# Patient Record
Sex: MALE | Race: WHITE | NOT HISPANIC OR LATINO | Employment: FULL TIME | URBAN - METROPOLITAN AREA
[De-identification: names, ages, dates, MRNs, and addresses within clinical notes are randomized per-mention and may not be internally consistent; named-entity substitution may affect disease eponyms.]

---

## 2019-07-04 ENCOUNTER — HOSPITAL ENCOUNTER (EMERGENCY)
Facility: CLINIC | Age: 24
Discharge: HOME | End: 2019-07-04
Attending: EMERGENCY MEDICINE | Admitting: EMERGENCY MEDICINE
Payer: COMMERCIAL

## 2019-07-04 VITALS
SYSTOLIC BLOOD PRESSURE: 151 MMHG | WEIGHT: 224 LBS | OXYGEN SATURATION: 96 % | TEMPERATURE: 98.7 F | HEIGHT: 73 IN | DIASTOLIC BLOOD PRESSURE: 78 MMHG | HEART RATE: 89 BPM | BODY MASS INDEX: 29.69 KG/M2 | RESPIRATION RATE: 20 BRPM

## 2019-07-04 DIAGNOSIS — L25.9 CONTACT DERMATITIS, UNSPECIFIED CONTACT DERMATITIS TYPE, UNSPECIFIED TRIGGER: ICD-10-CM

## 2019-07-04 DIAGNOSIS — L03.90 CELLULITIS, UNSPECIFIED CELLULITIS SITE: ICD-10-CM

## 2019-07-04 LAB
ANION GAP SERPL CALCULATED.3IONS-SCNC: 6 MMOL/L (ref 3–14)
BASOPHILS # BLD AUTO: 0 10E9/L (ref 0–0.2)
BASOPHILS NFR BLD AUTO: 0.5 %
BUN SERPL-MCNC: 14 MG/DL (ref 7–30)
CALCIUM SERPL-MCNC: 9 MG/DL (ref 8.5–10.1)
CHLORIDE SERPL-SCNC: 106 MMOL/L (ref 94–109)
CO2 SERPL-SCNC: 29 MMOL/L (ref 20–32)
CREAT SERPL-MCNC: 1.2 MG/DL (ref 0.66–1.25)
DIFFERENTIAL METHOD BLD: NORMAL
EOSINOPHIL # BLD AUTO: 0.2 10E9/L (ref 0–0.7)
EOSINOPHIL NFR BLD AUTO: 2.6 %
ERYTHROCYTE [DISTWIDTH] IN BLOOD BY AUTOMATED COUNT: 12.5 % (ref 10–15)
GFR SERPL CREATININE-BSD FRML MDRD: 84 ML/MIN/{1.73_M2}
GLUCOSE SERPL-MCNC: 107 MG/DL (ref 70–99)
HCT VFR BLD AUTO: 47.9 % (ref 40–53)
HGB BLD-MCNC: 16.3 G/DL (ref 13.3–17.7)
IMM GRANULOCYTES # BLD: 0 10E9/L (ref 0–0.4)
IMM GRANULOCYTES NFR BLD: 0.3 %
LYMPHOCYTES # BLD AUTO: 2.3 10E9/L (ref 0.8–5.3)
LYMPHOCYTES NFR BLD AUTO: 37.6 %
MCH RBC QN AUTO: 29.3 PG (ref 26.5–33)
MCHC RBC AUTO-ENTMCNC: 34 G/DL (ref 31.5–36.5)
MCV RBC AUTO: 86 FL (ref 78–100)
MONOCYTES # BLD AUTO: 0.4 10E9/L (ref 0–1.3)
MONOCYTES NFR BLD AUTO: 6.4 %
NEUTROPHILS # BLD AUTO: 3.3 10E9/L (ref 1.6–8.3)
NEUTROPHILS NFR BLD AUTO: 52.6 %
NRBC # BLD AUTO: 0 10*3/UL
NRBC BLD AUTO-RTO: 0 /100
PLATELET # BLD AUTO: 193 10E9/L (ref 150–450)
POTASSIUM SERPL-SCNC: 3.6 MMOL/L (ref 3.4–5.3)
RBC # BLD AUTO: 5.57 10E12/L (ref 4.4–5.9)
SODIUM SERPL-SCNC: 141 MMOL/L (ref 133–144)
WBC # BLD AUTO: 6.2 10E9/L (ref 4–11)

## 2019-07-04 PROCEDURE — 96374 THER/PROPH/DIAG INJ IV PUSH: CPT

## 2019-07-04 PROCEDURE — 85025 COMPLETE CBC W/AUTO DIFF WBC: CPT | Performed by: EMERGENCY MEDICINE

## 2019-07-04 PROCEDURE — 80048 BASIC METABOLIC PNL TOTAL CA: CPT | Performed by: EMERGENCY MEDICINE

## 2019-07-04 PROCEDURE — 25000132 ZZH RX MED GY IP 250 OP 250 PS 637: Performed by: EMERGENCY MEDICINE

## 2019-07-04 PROCEDURE — 96372 THER/PROPH/DIAG INJ SC/IM: CPT | Mod: 59

## 2019-07-04 PROCEDURE — 96375 TX/PRO/DX INJ NEW DRUG ADDON: CPT

## 2019-07-04 PROCEDURE — 25000128 H RX IP 250 OP 636: Performed by: EMERGENCY MEDICINE

## 2019-07-04 PROCEDURE — 99285 EMERGENCY DEPT VISIT HI MDM: CPT | Mod: 25

## 2019-07-04 RX ORDER — EPINEPHRINE 1 MG/ML
0.5 INJECTION, SOLUTION, CONCENTRATE INTRAVENOUS ONCE
Status: COMPLETED | OUTPATIENT
Start: 2019-07-04 | End: 2019-07-04

## 2019-07-04 RX ORDER — CEPHALEXIN 500 MG/1
500 CAPSULE ORAL 3 TIMES DAILY
Qty: 21 CAPSULE | Refills: 0 | Status: SHIPPED | OUTPATIENT
Start: 2019-07-04 | End: 2019-07-04

## 2019-07-04 RX ORDER — CETIRIZINE HYDROCHLORIDE 10 MG/1
10 TABLET ORAL DAILY
Qty: 10 TABLET | Refills: 0 | Status: SHIPPED | OUTPATIENT
Start: 2019-07-04 | End: 2019-09-20

## 2019-07-04 RX ORDER — DIPHENHYDRAMINE HCL 25 MG
25 CAPSULE ORAL ONCE
Status: COMPLETED | OUTPATIENT
Start: 2019-07-04 | End: 2019-07-04

## 2019-07-04 RX ORDER — CEPHALEXIN 500 MG/1
500 CAPSULE ORAL ONCE
Status: DISCONTINUED | OUTPATIENT
Start: 2019-07-04 | End: 2019-07-04 | Stop reason: HOSPADM

## 2019-07-04 RX ORDER — DIPHENHYDRAMINE HYDROCHLORIDE 50 MG/ML
25 INJECTION INTRAMUSCULAR; INTRAVENOUS ONCE
Status: COMPLETED | OUTPATIENT
Start: 2019-07-04 | End: 2019-07-04

## 2019-07-04 RX ORDER — PREDNISONE 20 MG/1
40 TABLET ORAL DAILY
Qty: 8 TABLET | Refills: 0 | Status: SHIPPED | OUTPATIENT
Start: 2019-07-04 | End: 2019-09-20

## 2019-07-04 RX ORDER — HYDROXYZINE HYDROCHLORIDE 25 MG/1
25 TABLET, FILM COATED ORAL
Qty: 10 TABLET | Refills: 0 | Status: SHIPPED | OUTPATIENT
Start: 2019-07-04 | End: 2022-07-13

## 2019-07-04 RX ORDER — METHYLPREDNISOLONE SODIUM SUCCINATE 125 MG/2ML
125 INJECTION, POWDER, LYOPHILIZED, FOR SOLUTION INTRAMUSCULAR; INTRAVENOUS ONCE
Status: COMPLETED | OUTPATIENT
Start: 2019-07-04 | End: 2019-07-04

## 2019-07-04 RX ORDER — DOXYCYCLINE 100 MG/1
100 CAPSULE ORAL 2 TIMES DAILY
Qty: 14 CAPSULE | Refills: 0 | Status: SHIPPED | OUTPATIENT
Start: 2019-07-04 | End: 2019-09-20

## 2019-07-04 RX ORDER — MUPIROCIN 20 MG/G
OINTMENT TOPICAL 3 TIMES DAILY
Qty: 15 G | Refills: 0 | Status: SHIPPED | OUTPATIENT
Start: 2019-07-04 | End: 2022-07-13

## 2019-07-04 RX ADMIN — DIPHENHYDRAMINE HYDROCHLORIDE 25 MG: 50 INJECTION, SOLUTION INTRAMUSCULAR; INTRAVENOUS at 05:26

## 2019-07-04 RX ADMIN — DIPHENHYDRAMINE HYDROCHLORIDE 25 MG: 25 CAPSULE ORAL at 05:26

## 2019-07-04 RX ADMIN — EPINEPHRINE 0.5 MG: 1 INJECTION INTRAMUSCULAR; INTRAVENOUS; SUBCUTANEOUS at 05:26

## 2019-07-04 RX ADMIN — METHYLPREDNISOLONE SODIUM SUCCINATE 125 MG: 125 INJECTION, POWDER, FOR SOLUTION INTRAMUSCULAR; INTRAVENOUS at 05:26

## 2019-07-04 ASSESSMENT — ENCOUNTER SYMPTOMS
VOMITING: 0
CHILLS: 0
COUGH: 0
NAUSEA: 0
FEVER: 0
WOUND: 1

## 2019-07-04 ASSESSMENT — MIFFLIN-ST. JEOR: SCORE: 2059.94

## 2019-07-04 NOTE — DISCHARGE INSTRUCTIONS
Use Zyrtec during the day (less sedating anti-histamine)  and Hydroxyzine at night (more sedating) to help with itching.     Steroids for 4 more days    Change antibiotic to Doxycycline    Use antibiotic ointment (mupirocin) on open areas (L forearm and R ear)

## 2019-07-04 NOTE — ED PROVIDER NOTES
"  History     Chief Complaint:  Allergic Reaction     HPI   John Lamar is a 24 year old male who presents with his mother to the ED for evaluation of allergic reaction. The patient reports he developed bites on his left arm 2 days ago. He has subsequently developed spreading, painful, itchy, blotchy hives all over his body including face, ear, arms, torso, and genital. He last took 25mg Benadryl at 11:00PM without alleviation of symptoms. [place]. He had a tick on his side but was able to pick it off. He also uprooted a flower to bring back to plant. He had no immediate irritation. He did use Raid after planting the flower back home. The patient denies any fever, chills, cough, nausea, vomiting, or penile bleeding/discharge. He denies history of skin infections.       Allergies:  Sulfa drugs: rash      Medications:    Concerta     Past Medical History:    ADHD  Arthritis    Past Surgical History:    History reviewed. No pertinent surgical history.    Family History:    History reviewed. No pertinent family history.     Social History:  Smoking status: Never smoker    Alcohol use: Yes   Presents to ED with mother    Marital Status:  Single [1]     Review of Systems   Constitutional: Negative for chills and fever.   Respiratory: Negative for cough.    Gastrointestinal: Negative for nausea and vomiting.   Genitourinary: Negative for discharge.   Skin: Positive for rash and wound.   All other systems reviewed and are negative.    Physical Exam     Patient Vitals for the past 24 hrs:   BP Temp Temp src Pulse Resp SpO2 Height Weight   07/04/19 0600 141/89 -- -- 85 -- 96 % -- --   07/04/19 0540 (!) 145/97 -- -- 70 -- 98 % -- --   07/04/19 0505 (!) 133/92 -- -- 82 -- 98 % -- --   07/04/19 0455 (!) 145/97 98.7  F (37.1  C) Temporal 71 20 -- 1.854 m (6' 1\") 101.6 kg (224 lb)     Physical Exam    HEENT: Posterior pharynx with no significant swelling, no lingual " swelling or sublingual swelling.  No intraoral sores mmm  Neck: supple, no stridor  CV: ppi, regular   Resp: Clear to auscultation in all fields, speaking in full sentences with any resp distress  Abd: abdomen is soft without significant tenderness, masses, organomegaly or guarding  Ext: peripheral edema present:  No  Skin: warm dry well perfused      Volar surface left forearm cluster of 3 vesicular appearing lesions that spontaneously ruptured now with honey crust surrounded by blanching erythema and numerous lesions extending proximally to the left upper arm.  Distally there is a horizontal area of blanching erythema.  Some of the larger lesions have central clearing.     Right infraorbital tissue with mild erythema and soft tissue swelling.  Mild erythema and soft tissue swelling of the left lateral orbital tissue.  Small area horizontally across the midpoint of the forehead.     Small horizontal area at the waistband of his underwear on the right side of blanching erythema.     Moderate erythema and swelling of the distal shaft of the penis on the right side and superior aspect of the scrotum.  No ulceration at the opening of the urethra or blood or discharge.    Neuro: Alert, no gross motor or sensory deficits,  gait stable            Emergency Department Course     Laboratory:  CBC: o/w WNL (WBC 6.2, HGB 16.3, )  BMP: Glucose 107(H), o/w WNL (Creatinine 1.20)     Interventions:  0526: Epinephrine 0.5mg IM  0526: Solu-medrol 125mg IV  0526: Benadryl 25mg IV  0526: Benadryl 25mg PO    Emergency Department Course:  Past medical records, nursing notes, and vitals reviewed.  0505: I performed an exam of the patient and obtained history, as documented above.    IV inserted and blood drawn.    Patient was provided medications as noted above.    0541: Per nurse's report, the patient notes his throat feels tighter after the interventions.     0542: I rechecked the patient. Explained findings to patient and  mother.    0600: I rechecked the patient.     0625: I rechecked the patient.    Findings and plan explained to the Patient and mother. Patient discharged home with instructions regarding supportive care, medications, and reasons to return. The importance of close follow-up was reviewed.     Impression & Plan      Medical Decision Makin 4-year-old male presenting with likely started as a contact dermatitis with self spread to the affected areas and concern for superimposed impetigo/bacterial skin infection started on Keflex by urgent care yesterday, will change this to doxycycline.  Given a trial of IM epi here in addition to steroids and antihistamine to help with the swelling and itching.  We will keep him on steroids and antihistamines have him follow-up with his PCP and try to given dermatology if not improving as expected.  Patient verbalized understanding and agreement with plan.  Also understands when to return to the ER.     Seems most likely started with some kind of contact irritant based on the pictures he shows me from when it started possible it was a bite from a mite seems less likely to be a tick/Lyme disease.  As the skip lesions of the left forearm to the a forehead penis without mucosal membrane involvement argues against Lee-Daniel syndrome or erythema multiforme or other more systemic conditions.      Diagnosis:    ICD-10-CM   1. Contact dermatitis, unspecified contact dermatitis type, unspecified trigger L25.9   2. Cellulitis, unspecified cellulitis site L03.90     Disposition: Patient discharged to home with mother      Discharge Medications:  cetirizine 10 MG tablet  Commonly known as:  zyrTEC  10 mg, Oral, DAILY     doxycycline hyclate 100 MG capsule  Commonly known as:  VIBRAMYCIN  100 mg, Oral, 2 TIMES DAILY     hydrOXYzine 25 MG tablet  Commonly known as:  ATARAX  25 mg, Oral, AT BEDTIME PRN     mupirocin 2 % external ointment  Commonly known as:  BACTROBAN  Topical, 3 TIMES  DAILY     predniSONE 20 MG tablet  Commonly known as:  DELTASONE  40 mg, Oral, DAILY       Jaycee Martinez  7/4/2019   [facility] EMERGENCY DEPARTMENT    Scribe Disclosure:  I, Jaycee Martinez, am serving as a scribe at 5:05 AM on 7/4/2019 to document services personally performed by Cody Betts MD based on my observations and the provider's statements to me.        Cody Betts MD  07/04/19 0635       Cody Betts MD  07/04/19 0651

## 2019-07-04 NOTE — ED TRIAGE NOTES
Pt to ER with c/o allergic reaction. Pt noted spots on his left arm  On Monday now has spread to right eye and penis as well

## 2019-07-04 NOTE — ED AVS SNAPSHOT
[facility] Emergency Department  [address]  [facility] [address]  Phone:  [phone]  Fax:  [phone]                                    John Lamar   MRN: [id number]    Department:  [facility] Emergency Department   Date of Visit:  7/4/2019           After Visit Summary Signature Page    I have received my discharge instructions, and my questions have been answered. I have discussed any challenges I see with this plan with the nurse or doctor.    ..........................................................................................................................................  Patient/Patient Representative Signature      ..........................................................................................................................................  Patient Representative Print Name and Relationship to Patient    ..................................................               ................................................  Date                                   Time    ..........................................................................................................................................  Reviewed by Signature/Title    ...................................................              ..............................................  Date                                               Time          22EPIC Rev 08/18

## 2019-07-08 ENCOUNTER — TELEPHONE (OUTPATIENT)
Dept: DERMATOLOGY | Facility: CLINIC | Age: 24
End: 2019-07-08

## 2019-07-08 NOTE — TELEPHONE ENCOUNTER
[facility] Call Center    Phone Message    May a detailed message be left on voicemail: yes    Reason for Call: Symptoms or Concerns     If patient has red-flag symptoms, warm transfer to triage line    Current symptom or concern: Allergic reaction (rash) is spreading everywhere    Symptoms have been present for: 7-8 days     Has patient previously been seen for this? Yes    By : Dr. Cody Betts (ER Physician at [place])    Date: 7/04/19    Are there any new or worsening symptoms? Yes: see above    Mom was wondering if it's possible for the pt to come in sooner than 7/15.  Please call mom if there is a sooner date.  Thanks      Action Taken: Message routed to:  [facility] ([facility]): Derm clinic

## 2019-07-15 ENCOUNTER — PRE VISIT (OUTPATIENT)
Dept: DERMATOLOGY | Facility: CLINIC | Age: 24
End: 2019-07-15

## 2019-07-15 NOTE — TELEPHONE ENCOUNTER
FUTURE VISIT INFORMATION      FUTURE VISIT INFORMATION:    Date: 7.15.19    Time: 3:30 pm    Location:  Derm  REFERRAL INFORMATION:    Referring provider:  Dr. Cody Betts    Referring providers clinic:  [facility] Emergency Dept    Reason for visit/diagnosis:  New, allergy reaction for 7-8 days now, rash is still spreading and is now spreading to the back per Mom, referred by Dr. Cody Betts from [place].    RECORDS REQUESTED FROM:       Clinic name Comments Records Status Imaging Status   [facility] ED 7.4.19 Dr. Cody Betts In Epic In Epic

## 2019-09-05 ENCOUNTER — APPOINTMENT (OUTPATIENT)
Dept: CT IMAGING | Facility: CLINIC | Age: 24
End: 2019-09-05
Attending: EMERGENCY MEDICINE
Payer: COMMERCIAL

## 2019-09-05 ENCOUNTER — HOSPITAL ENCOUNTER (OUTPATIENT)
Facility: CLINIC | Age: 24
Discharge: HOME | End: 2019-09-06
Attending: EMERGENCY MEDICINE | Admitting: SURGERY
Payer: COMMERCIAL

## 2019-09-05 DIAGNOSIS — K35.30 ACUTE APPENDICITIS WITH LOCALIZED PERITONITIS, WITHOUT PERFORATION OR ABSCESS, UNSPECIFIED WHETHER GANGRENE PRESENT: ICD-10-CM

## 2019-09-05 LAB
ANION GAP SERPL CALCULATED.3IONS-SCNC: 2 MMOL/L (ref 3–14)
BASOPHILS # BLD AUTO: 0 10E9/L (ref 0–0.2)
BASOPHILS NFR BLD AUTO: 0.4 %
BUN SERPL-MCNC: 16 MG/DL (ref 7–30)
CALCIUM SERPL-MCNC: 9.3 MG/DL (ref 8.5–10.1)
CHLORIDE SERPL-SCNC: 108 MMOL/L (ref 94–109)
CO2 SERPL-SCNC: 32 MMOL/L (ref 20–32)
CREAT SERPL-MCNC: 0.96 MG/DL (ref 0.66–1.25)
DIFFERENTIAL METHOD BLD: NORMAL
EOSINOPHIL # BLD AUTO: 0.1 10E9/L (ref 0–0.7)
EOSINOPHIL NFR BLD AUTO: 0.5 %
ERYTHROCYTE [DISTWIDTH] IN BLOOD BY AUTOMATED COUNT: 12.5 % (ref 10–15)
GFR SERPL CREATININE-BSD FRML MDRD: >90 ML/MIN/{1.73_M2}
GLUCOSE SERPL-MCNC: 105 MG/DL (ref 70–99)
HCT VFR BLD AUTO: 49.5 % (ref 40–53)
HGB BLD-MCNC: 16.8 G/DL (ref 13.3–17.7)
IMM GRANULOCYTES # BLD: 0 10E9/L (ref 0–0.4)
IMM GRANULOCYTES NFR BLD: 0.3 %
LYMPHOCYTES # BLD AUTO: 1.5 10E9/L (ref 0.8–5.3)
LYMPHOCYTES NFR BLD AUTO: 14.6 %
MCH RBC QN AUTO: 29.7 PG (ref 26.5–33)
MCHC RBC AUTO-ENTMCNC: 33.9 G/DL (ref 31.5–36.5)
MCV RBC AUTO: 88 FL (ref 78–100)
MONOCYTES # BLD AUTO: 0.7 10E9/L (ref 0–1.3)
MONOCYTES NFR BLD AUTO: 6.5 %
NEUTROPHILS # BLD AUTO: 7.8 10E9/L (ref 1.6–8.3)
NEUTROPHILS NFR BLD AUTO: 77.7 %
NRBC # BLD AUTO: 0 10*3/UL
NRBC BLD AUTO-RTO: 0 /100
PLATELET # BLD AUTO: 210 10E9/L (ref 150–450)
POTASSIUM SERPL-SCNC: 3.9 MMOL/L (ref 3.4–5.3)
RBC # BLD AUTO: 5.65 10E12/L (ref 4.4–5.9)
SODIUM SERPL-SCNC: 142 MMOL/L (ref 133–144)
WBC # BLD AUTO: 10 10E9/L (ref 4–11)

## 2019-09-05 PROCEDURE — 80048 BASIC METABOLIC PNL TOTAL CA: CPT | Performed by: EMERGENCY MEDICINE

## 2019-09-05 PROCEDURE — 99285 EMERGENCY DEPT VISIT HI MDM: CPT | Mod: 25

## 2019-09-05 PROCEDURE — 85025 COMPLETE CBC W/AUTO DIFF WBC: CPT | Performed by: EMERGENCY MEDICINE

## 2019-09-05 PROCEDURE — 74177 CT ABD & PELVIS W/CONTRAST: CPT

## 2019-09-05 PROCEDURE — 25000128 H RX IP 250 OP 636: Performed by: EMERGENCY MEDICINE

## 2019-09-05 PROCEDURE — 25000125 ZZHC RX 250: Performed by: EMERGENCY MEDICINE

## 2019-09-05 RX ORDER — CEFOTETAN DISODIUM 2 G/20ML
2 INJECTION, POWDER, FOR SOLUTION INTRAMUSCULAR; INTRAVENOUS ONCE
Status: COMPLETED | OUTPATIENT
Start: 2019-09-05 | End: 2019-09-06

## 2019-09-05 RX ORDER — IOPAMIDOL 755 MG/ML
500 INJECTION, SOLUTION INTRAVASCULAR ONCE
Status: COMPLETED | OUTPATIENT
Start: 2019-09-05 | End: 2019-09-05

## 2019-09-05 RX ADMIN — SODIUM CHLORIDE 65 ML: 9 INJECTION, SOLUTION INTRAVENOUS at 23:15

## 2019-09-05 RX ADMIN — IOPAMIDOL 100 ML: 755 INJECTION, SOLUTION INTRAVENOUS at 23:15

## 2019-09-05 ASSESSMENT — ENCOUNTER SYMPTOMS
SHORTNESS OF BREATH: 0
ABDOMINAL DISTENTION: 0
CONSTIPATION: 0
DIARRHEA: 0
VOMITING: 0
ACTIVITY CHANGE: 0
FEVER: 0
DYSURIA: 0
ABDOMINAL PAIN: 1
NAUSEA: 1
DIFFICULTY URINATING: 0
DIZZINESS: 0

## 2019-09-06 ENCOUNTER — ANESTHESIA (OUTPATIENT)
Dept: SURGERY | Facility: CLINIC | Age: 24
End: 2019-09-06
Payer: COMMERCIAL

## 2019-09-06 ENCOUNTER — ANESTHESIA EVENT (OUTPATIENT)
Dept: SURGERY | Facility: CLINIC | Age: 24
End: 2019-09-06
Payer: COMMERCIAL

## 2019-09-06 VITALS
OXYGEN SATURATION: 97 % | DIASTOLIC BLOOD PRESSURE: 82 MMHG | BODY MASS INDEX: 29.03 KG/M2 | TEMPERATURE: 97.5 F | WEIGHT: 220 LBS | HEART RATE: 80 BPM | SYSTOLIC BLOOD PRESSURE: 130 MMHG | RESPIRATION RATE: 20 BRPM

## 2019-09-06 PROCEDURE — 25000128 H RX IP 250 OP 636: Performed by: ANESTHESIOLOGY

## 2019-09-06 PROCEDURE — 71000013 ZZH RECOVERY PHASE 1 LEVEL 1 EA ADDTL HR: Performed by: SURGERY

## 2019-09-06 PROCEDURE — 37000008 ZZH ANESTHESIA TECHNICAL FEE, 1ST 30 MIN: Performed by: SURGERY

## 2019-09-06 PROCEDURE — 25800025 ZZH RX 258: Performed by: SURGERY

## 2019-09-06 PROCEDURE — 71000027 ZZH RECOVERY PHASE 2 EACH 15 MINS: Performed by: SURGERY

## 2019-09-06 PROCEDURE — 27210794 ZZH OR GENERAL SUPPLY STERILE: Performed by: SURGERY

## 2019-09-06 PROCEDURE — 88304 TISSUE EXAM BY PATHOLOGIST: CPT | Mod: 26 | Performed by: SURGERY

## 2019-09-06 PROCEDURE — 25000132 ZZH RX MED GY IP 250 OP 250 PS 637: Performed by: SURGERY

## 2019-09-06 PROCEDURE — 25000125 ZZHC RX 250: Performed by: EMERGENCY MEDICINE

## 2019-09-06 PROCEDURE — 71000012 ZZH RECOVERY PHASE 1 LEVEL 1 FIRST HR: Performed by: SURGERY

## 2019-09-06 PROCEDURE — 25800030 ZZH RX IP 258 OP 636: Performed by: ANESTHESIOLOGY

## 2019-09-06 PROCEDURE — 25000125 ZZHC RX 250: Performed by: NURSE ANESTHETIST, CERTIFIED REGISTERED

## 2019-09-06 PROCEDURE — 44970 LAPAROSCOPY APPENDECTOMY: CPT | Performed by: SURGERY

## 2019-09-06 PROCEDURE — 88304 TISSUE EXAM BY PATHOLOGIST: CPT | Performed by: SURGERY

## 2019-09-06 PROCEDURE — 99203 OFFICE O/P NEW LOW 30 MIN: CPT | Mod: 57 | Performed by: SURGERY

## 2019-09-06 PROCEDURE — 36000056 ZZH SURGERY LEVEL 3 1ST 30 MIN: Performed by: SURGERY

## 2019-09-06 PROCEDURE — 37000009 ZZH ANESTHESIA TECHNICAL FEE, EACH ADDTL 15 MIN: Performed by: SURGERY

## 2019-09-06 PROCEDURE — 25000128 H RX IP 250 OP 636: Performed by: SURGERY

## 2019-09-06 PROCEDURE — 40000306 ZZH STATISTIC PRE PROC ASSESS II: Performed by: SURGERY

## 2019-09-06 PROCEDURE — 25000128 H RX IP 250 OP 636: Performed by: NURSE ANESTHETIST, CERTIFIED REGISTERED

## 2019-09-06 PROCEDURE — 36000058 ZZH SURGERY LEVEL 3 EA 15 ADDTL MIN: Performed by: SURGERY

## 2019-09-06 RX ORDER — FENTANYL CITRATE 50 UG/ML
25-50 INJECTION, SOLUTION INTRAMUSCULAR; INTRAVENOUS
Status: DISCONTINUED | OUTPATIENT
Start: 2019-09-06 | End: 2019-09-06 | Stop reason: HOSPADM

## 2019-09-06 RX ORDER — MEPERIDINE HYDROCHLORIDE 25 MG/ML
12.5 INJECTION INTRAMUSCULAR; INTRAVENOUS; SUBCUTANEOUS
Status: DISCONTINUED | OUTPATIENT
Start: 2019-09-06 | End: 2019-09-06 | Stop reason: HOSPADM

## 2019-09-06 RX ORDER — BUPIVACAINE HYDROCHLORIDE 5 MG/ML
INJECTION, SOLUTION EPIDURAL; INTRACAUDAL PRN
Status: DISCONTINUED | OUTPATIENT
Start: 2019-09-06 | End: 2019-09-06 | Stop reason: HOSPADM

## 2019-09-06 RX ORDER — LIDOCAINE 40 MG/G
CREAM TOPICAL
Status: DISCONTINUED | OUTPATIENT
Start: 2019-09-06 | End: 2019-09-06 | Stop reason: HOSPADM

## 2019-09-06 RX ORDER — DEXAMETHASONE SODIUM PHOSPHATE 4 MG/ML
INJECTION, SOLUTION INTRA-ARTICULAR; INTRALESIONAL; INTRAMUSCULAR; INTRAVENOUS; SOFT TISSUE PRN
Status: DISCONTINUED | OUTPATIENT
Start: 2019-09-06 | End: 2019-09-06

## 2019-09-06 RX ORDER — FENTANYL CITRATE 50 UG/ML
INJECTION, SOLUTION INTRAMUSCULAR; INTRAVENOUS PRN
Status: DISCONTINUED | OUTPATIENT
Start: 2019-09-06 | End: 2019-09-06

## 2019-09-06 RX ORDER — SODIUM CHLORIDE, SODIUM LACTATE, POTASSIUM CHLORIDE, CALCIUM CHLORIDE 600; 310; 30; 20 MG/100ML; MG/100ML; MG/100ML; MG/100ML
INJECTION, SOLUTION INTRAVENOUS CONTINUOUS
Status: DISCONTINUED | OUTPATIENT
Start: 2019-09-06 | End: 2019-09-06 | Stop reason: HOSPADM

## 2019-09-06 RX ORDER — ONDANSETRON 2 MG/ML
4 INJECTION INTRAMUSCULAR; INTRAVENOUS EVERY 30 MIN PRN
Status: DISCONTINUED | OUTPATIENT
Start: 2019-09-06 | End: 2019-09-06 | Stop reason: HOSPADM

## 2019-09-06 RX ORDER — ONDANSETRON 4 MG/1
4 TABLET, ORALLY DISINTEGRATING ORAL EVERY 30 MIN PRN
Status: DISCONTINUED | OUTPATIENT
Start: 2019-09-06 | End: 2019-09-06 | Stop reason: HOSPADM

## 2019-09-06 RX ORDER — ONDANSETRON 2 MG/ML
INJECTION INTRAMUSCULAR; INTRAVENOUS PRN
Status: DISCONTINUED | OUTPATIENT
Start: 2019-09-06 | End: 2019-09-06

## 2019-09-06 RX ORDER — HYDROMORPHONE HYDROCHLORIDE 1 MG/ML
.3-.5 INJECTION, SOLUTION INTRAMUSCULAR; INTRAVENOUS; SUBCUTANEOUS EVERY 10 MIN PRN
Status: DISCONTINUED | OUTPATIENT
Start: 2019-09-06 | End: 2019-09-06 | Stop reason: HOSPADM

## 2019-09-06 RX ORDER — OXYCODONE HYDROCHLORIDE 5 MG/1
5 TABLET ORAL
Status: COMPLETED | OUTPATIENT
Start: 2019-09-06 | End: 2019-09-06

## 2019-09-06 RX ORDER — OXYCODONE HYDROCHLORIDE 5 MG/1
5-10 TABLET ORAL EVERY 4 HOURS PRN
Qty: 16 TABLET | Refills: 0 | Status: SHIPPED | OUTPATIENT
Start: 2019-09-06 | End: 2019-09-20

## 2019-09-06 RX ORDER — FENTANYL CITRATE 50 UG/ML
25-50 INJECTION, SOLUTION INTRAMUSCULAR; INTRAVENOUS
Status: CANCELLED | OUTPATIENT
Start: 2019-09-06

## 2019-09-06 RX ORDER — NALOXONE HYDROCHLORIDE 0.4 MG/ML
.1-.4 INJECTION, SOLUTION INTRAMUSCULAR; INTRAVENOUS; SUBCUTANEOUS
Status: DISCONTINUED | OUTPATIENT
Start: 2019-09-06 | End: 2019-09-06 | Stop reason: HOSPADM

## 2019-09-06 RX ORDER — GLYCOPYRROLATE 0.2 MG/ML
INJECTION, SOLUTION INTRAMUSCULAR; INTRAVENOUS PRN
Status: DISCONTINUED | OUTPATIENT
Start: 2019-09-06 | End: 2019-09-06

## 2019-09-06 RX ORDER — NEOSTIGMINE METHYLSULFATE 1 MG/ML
VIAL (ML) INJECTION PRN
Status: DISCONTINUED | OUTPATIENT
Start: 2019-09-06 | End: 2019-09-06

## 2019-09-06 RX ORDER — KETOROLAC TROMETHAMINE 30 MG/ML
30 INJECTION, SOLUTION INTRAMUSCULAR; INTRAVENOUS EVERY 6 HOURS PRN
Status: DISCONTINUED | OUTPATIENT
Start: 2019-09-06 | End: 2019-09-06 | Stop reason: HOSPADM

## 2019-09-06 RX ORDER — PROPOFOL 10 MG/ML
INJECTION, EMULSION INTRAVENOUS PRN
Status: DISCONTINUED | OUTPATIENT
Start: 2019-09-06 | End: 2019-09-06

## 2019-09-06 RX ORDER — LIDOCAINE HYDROCHLORIDE 10 MG/ML
INJECTION, SOLUTION INFILTRATION; PERINEURAL PRN
Status: DISCONTINUED | OUTPATIENT
Start: 2019-09-06 | End: 2019-09-06

## 2019-09-06 RX ADMIN — Medication 5 MG: at 01:56

## 2019-09-06 RX ADMIN — FENTANYL CITRATE 100 MCG: 50 INJECTION, SOLUTION INTRAMUSCULAR; INTRAVENOUS at 01:18

## 2019-09-06 RX ADMIN — KETOROLAC TROMETHAMINE 30 MG: 30 INJECTION, SOLUTION INTRAMUSCULAR at 03:18

## 2019-09-06 RX ADMIN — SODIUM CHLORIDE, POTASSIUM CHLORIDE, SODIUM LACTATE AND CALCIUM CHLORIDE: 600; 310; 30; 20 INJECTION, SOLUTION INTRAVENOUS at 03:41

## 2019-09-06 RX ADMIN — GLYCOPYRROLATE 0.2 MG: 0.2 INJECTION, SOLUTION INTRAMUSCULAR; INTRAVENOUS at 01:18

## 2019-09-06 RX ADMIN — CEFOTETAN DISODIUM 2 G: 2 INJECTION, POWDER, FOR SOLUTION INTRAMUSCULAR; INTRAVENOUS at 00:06

## 2019-09-06 RX ADMIN — SODIUM CHLORIDE, POTASSIUM CHLORIDE, SODIUM LACTATE AND CALCIUM CHLORIDE: 600; 310; 30; 20 INJECTION, SOLUTION INTRAVENOUS at 01:09

## 2019-09-06 RX ADMIN — LIDOCAINE HYDROCHLORIDE 30 MG: 10 INJECTION, SOLUTION INFILTRATION; PERINEURAL at 01:18

## 2019-09-06 RX ADMIN — FENTANYL CITRATE 50 MCG: 50 INJECTION INTRAMUSCULAR; INTRAVENOUS at 03:03

## 2019-09-06 RX ADMIN — OXYCODONE HYDROCHLORIDE 5 MG: 5 TABLET ORAL at 03:56

## 2019-09-06 RX ADMIN — MIDAZOLAM 2 MG: 1 INJECTION INTRAMUSCULAR; INTRAVENOUS at 01:09

## 2019-09-06 RX ADMIN — FENTANYL CITRATE 50 MCG: 50 INJECTION, SOLUTION INTRAMUSCULAR; INTRAVENOUS at 02:10

## 2019-09-06 RX ADMIN — PROPOFOL 200 MG: 10 INJECTION, EMULSION INTRAVENOUS at 01:18

## 2019-09-06 RX ADMIN — GLYCOPYRROLATE 0.6 MG: 0.2 INJECTION, SOLUTION INTRAMUSCULAR; INTRAVENOUS at 01:56

## 2019-09-06 RX ADMIN — Medication 80 MG: at 01:18

## 2019-09-06 RX ADMIN — ONDANSETRON HYDROCHLORIDE 4 MG: 2 INJECTION, SOLUTION INTRAVENOUS at 01:43

## 2019-09-06 RX ADMIN — FENTANYL CITRATE 50 MCG: 50 INJECTION, SOLUTION INTRAMUSCULAR; INTRAVENOUS at 01:39

## 2019-09-06 RX ADMIN — FENTANYL CITRATE 50 MCG: 50 INJECTION INTRAMUSCULAR; INTRAVENOUS at 03:18

## 2019-09-06 RX ADMIN — ROCURONIUM BROMIDE 10 MG: 10 INJECTION INTRAVENOUS at 01:30

## 2019-09-06 RX ADMIN — ROCURONIUM BROMIDE 30 MG: 10 INJECTION INTRAVENOUS at 01:24

## 2019-09-06 RX ADMIN — DEXAMETHASONE SODIUM PHOSPHATE 4 MG: 4 INJECTION, SOLUTION INTRA-ARTICULAR; INTRALESIONAL; INTRAMUSCULAR; INTRAVENOUS; SOFT TISSUE at 01:18

## 2019-09-06 NOTE — ED PROVIDER NOTES
History     Chief Complaint:  Abdominal Pain    The history is provided by the patient.      John Lamar is a 24 year old male with a history of ADHD who presents to the emergency department with his mother for evaluation of abdominal pain.    Patient awoke this am with generalized abdominal discomfort and nausea. He took a nap and upon awakening pain had localized to RLQ. Tolerated [employer], but prompted to the ED when pain worsened. Pain worse with movement. No fevers. No h/o abdominal surgeries.     Allergies:  Sulfa drugs, rash     Medications:    Atarax  Concerta PO    Past Medical History:    ADHD  Arthritis  Cervical pain  Sprain of lumbar region  Thoracic spine pain    Past Surgical History:    The patient does not have any pertinent past surgical history.    Family History:    No past pertinent family history.    Social History:  Negative for tobacco use.  Positive for alcohol use.   Negative for drug use.  Marital Status:  Single     Review of Systems   Constitutional: Negative for activity change and fever.   Respiratory: Negative for shortness of breath.    Cardiovascular: Negative for chest pain.   Gastrointestinal: Positive for abdominal pain (RLQ) and nausea. Negative for abdominal distention, constipation, diarrhea and vomiting.   Genitourinary: Negative for difficulty urinating, dysuria, genital sores, scrotal swelling and testicular pain.   Skin: Negative for rash.   Neurological: Negative for dizziness.   All other systems reviewed and are negative.    Physical Exam     Patient Vitals for the past 24 hrs:   BP Temp Temp src Pulse Heart Rate Resp SpO2 Weight   09/06/19 0030 125/62 -- -- 90 -- -- 93 % --   09/06/19 0015 139/85 -- -- 89 -- -- 97 % --   09/06/19 0000 129/84 -- -- 85 -- -- 98 % --   09/05/19 2345 133/75 -- -- 93 -- -- 99 % --   09/05/19 2330 (!) 131/92 -- -- 84 -- -- 97 % --   09/05/19 2300 130/86 -- -- 88 -- -- 99 % --   09/05/19 2245 128/84 -- -- 100 -- -- 99 % --   09/05/19  2240 -- -- -- -- -- -- 98 % --   09/05/19 2230 130/87 -- -- -- -- -- -- --   09/05/19 2036 (!) 127/97 98.7  F (37.1  C) Oral 89 89 18 99 % 99.8 kg (220 lb)     Physical Exam   Constitutional: He is oriented to person, place, and time. He appears well-developed and well-nourished. No distress.   HENT:   Head: Normocephalic and atraumatic.   Eyes: EOM are normal.   Neck: Normal range of motion.   Cardiovascular: Normal rate and regular rhythm.   Pulmonary/Chest: Effort normal and breath sounds normal. No respiratory distress.   Abdominal: Normal appearance. There is tenderness in the right lower quadrant. There is rebound. There is no rigidity and no guarding. No hernia.   Musculoskeletal: Normal range of motion.   Neurological: He is alert and oriented to person, place, and time.   Skin: Skin is warm and dry.   Psychiatric: He has a normal mood and affect.   Nursing note and vitals reviewed.    Emergency Department Course   Imaging:  CT Abdomen/Pelvis with IV contrast:   1.  Appendicitis without abscess. As per radiology.    Laboratory:  CBC: WBC: 10.0, HGB: 16.8, PLT: 210  BMP: Glucose 105 (H), Anion Gap: 2 (L), o/w WNL (Creatinine: 0.96)    Procedures:  None.    Interventions:  0006  Cefotan 2 g IV    Emergency Department Course:  Nursing notes and vitals reviewed. 2240 I performed an exam of the patient as documented above.     IV inserted. Medication was administered, see above. Blood drawn. This was sent to the lab for further testing, results above.    The patient was sent for an abdomen/pelvis while in the emergency department, findings above.     2340 I consulted with radiology, regarding the patient's history and presentation here in the emergency department.    2341 I rechecked the patient and discussed the results of his workup thus far. Findings and plan explained to the Patient and mother who consents to admission    2355 I consulted with Dr. Guardado, general surgery, regarding the patient's history and  presentation here in the emergency department. Recommended starting cefotetan in anticipation of OR tonight.    Impression & Plan    Medical Decision Making:  John Lamar presented with right lower quadrant abdominal pain and the CT scan confirms appendicitis.  There is no evidence of rupture or abscess at this time. Laboratory analysis was without abnormality.  Parenteral antibiotics of Cefotetan have been administered in the Emergency Department. The case was discussed with the on call surgeon and he will be going to the operating room for appendectomy. All questions were answered prior to departure to the OR. He was in agreement with the treatment plan.     Diagnosis:    ICD-10-CM    1. Acute appendicitis with localized peritonitis, without perforation or abscess, unspecified whether gangrene present K35.30        Disposition:  Admit to the hospital. To the OR.    Scribe Disclosure:  I, Mary Ann Doe, am serving as a scribe on 9/5/2019 at 10:41 PM to personally document services performed by Radha Beck MD, based on my observations and the provider's statements to me.     This patient was evaluated by and discussed with Staff, Dr. Vanessa Beck MD  [facility] Res[facility], [facility]  9/5/2019   [facility] EMERGENCY DEPARTMENT       Jerrica Beck MD  Resident  09/06/19 0050       Tucker Mendez DO  09/06/19 0056

## 2019-09-06 NOTE — ED PROVIDER NOTES
Emergency Department Attending Supervision Note  9/5/2019  10:54 PM      I evaluated this patient in conjunction with Dr. Radha Beck.      Briefly, the patient presented with RLQ abdominal pain. He woke this AM with generalized abdominal discomfort. Tried to go back to bed. By mid-day, localized to RLQ. Mild decreased appetite. Tolerated [employer], but RLQ pain has worsened.       On my exam,     Patient Vitals for the past 24 hrs:   BP Temp Temp src Pulse Heart Rate Resp SpO2 Weight   09/06/19 0030 125/62 -- -- 90 -- -- 93 % --   09/06/19 0015 139/85 -- -- 89 -- -- 97 % --   09/06/19 0000 129/84 -- -- 85 -- -- 98 % --   09/05/19 2345 133/75 -- -- 93 -- -- 99 % --   09/05/19 2330 (!) 131/92 -- -- 84 -- -- 97 % --   09/05/19 2300 130/86 -- -- 88 -- -- 99 % --   09/05/19 2245 128/84 -- -- 100 -- -- 99 % --   09/05/19 2240 -- -- -- -- -- -- 98 % --   09/05/19 2230 130/87 -- -- -- -- -- -- --   09/05/19 2036 (!) 127/97 98.7  F (37.1  C) Oral 89 89 18 99 % 99.8 kg (220 lb)       Constitutional: Vital signs reviewed as above.   HENT:    Head: No external signs of trauma noted.   Eyes: Conjunctivae are normal. Pupils are equal, round, and reactive to light.   Cardiovascular:    Normal rate, regular rhythm and normal heart sounds.     Exam reveals no friction rub.     No murmur heard.  Pulmonary/Chest:    Effort normal and breath sounds normal.    No respiratory distress.    There are no wheezes.    There are no rales.   Gastrointestinal:    Soft.    Bowel sounds normal.    There is no distension.    There is RLQ tenderness.    There is mild rebound and minimal guarding.   :   Normal appearance of external male genitalia   No penile discharge   Circumcised   Left testicle appears non-swollen. No left testicular tenderness. No masses palpated   Right testicle appears non-swollen. No right testicular tenderness. No masses palpated.  Musculoskeletal:    Normal range of motion.    Normal Tone  Neurological: Patient is alert  and oriented to person, place, and time.   Skin: Skin is warm and dry. Patient is not diaphoretic  Psychiatric: The patient appears calm      Results:    CT Abdomen Pelvis w Contrast   Final Result   CONCLUSION:    1.  Appendicitis without abscess.    2. Mildly prominent para-aortic lymph nodes which may be reactive      [Critical Result: Appendicitis without abscess]      Finding was identified on 9/5/2019 11:24 PM.       Dr. Tucker Mendez was contacted by me on 9/5/2019 11:34 PM and verbalized understanding of the critical result.                 Labs Ordered and Resulted from Time of ED Arrival Up to the Time of Departure from the ED   BASIC METABOLIC PANEL - Abnormal; Notable for the following components:       Result Value    Anion Gap 2 (*)     Glucose 105 (*)     All other components within normal limits   CBC WITH PLATELETS DIFFERENTIAL       ED course:    Medications   Provider ordered ALTERNATE pre op antibiotic. (has no administration in time range)   lidocaine 1 % 0.1-1 mL (has no administration in time range)   lidocaine (LMX4) cream (has no administration in time range)   sodium chloride (PF) 0.9% PF flush 3 mL (has no administration in time range)   sodium chloride (PF) 0.9% PF flush 3 mL (has no administration in time range)   lactated ringers infusion (has no administration in time range)   CT Scan Flush (65 mLs Intravenous Given 9/5/19 2315)   iopamidol (ISOVUE-370) solution 500 mL (100 mLs Intravenous Given 9/5/19 2315)   cefoTEtan (CEFOTAN) 2 g vial to attach to  ml bag (0 g Intravenous Stopped 9/6/19 0051)       ED Course as of Sep 06 0054   u Sep 05, 2019   0308 Dr Mendez D/W Dr. Beck. Discussed presentation, ddx, workup.      2301 Dr. Mendez' evaluation      8780 GFR Estimate If Black: >90     Patient was found to have appendicitis and after discussion with the surgeon the patient was sent to the OR for operative care.    Impression:    ICD-10-CM    1. Acute appendicitis with  localized peritonitis, without perforation or abscess, unspecified whether gangrene present K35.30          Tucker Mendez DO Burns, Bradley Joseph, DO  09/06/19 0055

## 2019-09-06 NOTE — OP NOTE
General Surgery Operative Note    Pre-operative diagnosis:  appendicitis   Post-operative diagnosis: same   Procedure: Laparoscopic Appendectomy   Surgeon: Alfredo Guardado MD   Assistant(s): NONE   Anesthesia: General    Estimated blood loss: 5 cc's   Drains placed: None   Complications:  None   Findings:   Acute appendicitis without obvious perforation.     Indications for operation: This is a 24-year-old gentleman who presented with 1 day of progressive right lower quadrant pain.  CT scan in the emergency room revealed findings consistent with acute appendicitis.  Laparoscopic appendectomy was recommended, and the procedure, along with his risks and complications, was discussed with the patient.  He agreed to proceed.    Details of the operation: After informed consent, the patient was taken to the operating room where he underwent satisfactory induction of general anesthesia.  The patient was sterilely prepped and draped and a supraumbilical skin incision was made.  Dissection was carried bluntly down to the fascia, which was opened using electrocautery.  The Falk trocar was introduced and fixed in place using stay sutures.  Pneumoperitoneum was achieved using CO2 insufflation and, under direct visualization, 2 lower abdominal 5 mm ports were placed.  The appendix was identified in the right lower quadrant.  It was clearly inflamed, but without evidence of perforation.  An Endo LUIS stapler was used to come across the cecum just below the base of the appendix and across the mesoappendix in a single fire.  The appendix was placed in an Endo Catch bag and brought out through the supraumbilical incision.  The staple line was inspected and had excellent hemostasis.  The abdomen was irrigated out using normal saline and the trocar sites were infiltrated with 0.5% Marcaine.  The trochars were removed under direct visualization and the supraumbilical fascia was closed using interrupted 0 Vicryl sutures.  Skin  incisions were closed using 4-0 subcuticular Vicryl followed by Steri-Strips.    The patient tolerated the procedure well and was transferred to the recovery room in satisfactory condition.  Sponge and needle counts were correct at the close of the case.    Specimens:   ID Type Source Tests Collected by Time Destination   A :  Tissue Appendix SURGICAL PATHOLOGY EXAM Alfredo Guardado MD 9/6/2019  1:54 AM            Alfredo Guardado MD

## 2019-09-06 NOTE — ANESTHESIA PREPROCEDURE EVALUATION
Anesthesia Pre-Procedure Evaluation    Patient: John Lamar   MRN: [id number] : 1995          Preoperative Diagnosis: appendicitis    Procedure(s):  APPENDECTOMY, LAPAROSCOPIC    Past Medical History:   Diagnosis Date     ADHD (attention deficit hyperactivity disorder)      Arthritis      History reviewed. No pertinent surgical history.  Anesthesia Evaluation     . Pt has had prior anesthetic. Type: General    No history of anesthetic complications          ROS/MED HX    ENT/Pulmonary:  - neg pulmonary ROS     Neurologic:  - neg neurologic ROS     Cardiovascular:  - neg cardiovascular ROS       METS/Exercise Tolerance:     Hematologic:  - neg hematologic  ROS       Musculoskeletal:  - neg musculoskeletal ROS       GI/Hepatic:     (+) appendicitis,       Renal/Genitourinary:  - ROS Renal section negative       Endo:  - neg endo ROS       Psychiatric:  - neg psychiatric ROS       Infectious Disease:  - neg infectious disease ROS       Malignancy:         Other:    - neg other ROS                      Physical Exam  Normal systems: cardiovascular, pulmonary and dental    Airway   Mallampati: III  TM distance: >3 FB  Neck ROM: full    Dental     Cardiovascular       Pulmonary             Lab Results   Component Value Date    WBC 10.0 2019    HGB 16.8 2019    HCT 49.5 2019     2019     2019    POTASSIUM 3.9 2019    CHLORIDE 108 2019    CO2 32 2019    BUN 16 2019    CR 0.96 2019     (H) 2019    ISRAEL 9.3 2019    ALBUMIN 4.6 2007    PROTTOTAL 7.7 2007    ALT 32 2007    AST 43 2007    ALKPHOS 269 2007    BILITOTAL 0.4 2007       Preop Vitals  BP Readings from Last 3 Encounters:   19 125/62   19 151/78   03/23/15 129/73    Pulse Readings from Last 3 Encounters:   19 90   19 89   03/23/15 86      Resp Readings from Last 3 Encounters:   19 18   19 20    SpO2  "Readings from Last 3 Encounters:   09/06/19 93%   07/04/19 96%   03/23/15 98%      Temp Readings from Last 1 Encounters:   09/05/19 98.7  F (37.1  C) (Oral)    Ht Readings from Last 1 Encounters:   07/04/19 1.854 m (6' 1\")      Wt Readings from Last 1 Encounters:   09/05/19 99.8 kg (220 lb)    Estimated body mass index is 29.03 kg/m  as calculated from the following:    Height as of 7/4/19: 1.854 m (6' 1\").    Weight as of this encounter: 99.8 kg (220 lb).       Anesthesia Plan      History & Physical Review  History and physical reviewed and following examination; no interval change.    ASA Status:  1 emergent.    NPO Status:  Waived due to emergency    Plan for General, RSI and ETT with Intravenous induction. Maintenance will be Balanced.    PONV prophylaxis:  Ondansetron (or other 5HT-3) and Dexamethasone or Solumedrol       Postoperative Care  Postoperative pain management:  IV analgesics, Oral pain medications and Multi-modal analgesia.      Consents  Anesthetic plan, risks, benefits and alternatives discussed with:  Patient..                 Goldy Rodriguez MD                    .  "

## 2019-09-06 NOTE — ANESTHESIA POSTPROCEDURE EVALUATION
Patient: John Lamar    Procedure(s):  APPENDECTOMY, LAPAROSCOPIC    Diagnosis:appendicitis  Diagnosis Additional Information: No value filed.    Anesthesia Type:  General, RSI, ETT    Note:  Anesthesia Post Evaluation    Patient location during evaluation: PACU  Patient participation: Able to fully participate in evaluation  Level of consciousness: awake and alert  Pain management: adequate  Airway patency: patent  Cardiovascular status: acceptable  Respiratory status: acceptable  Hydration status: acceptable  PONV: none     Anesthetic complications: None          Last vitals:  Vitals:    09/06/19 0245 09/06/19 0300 09/06/19 0330   BP: 136/84 131/82 (!) 142/95   Pulse: 66 71    Resp: 28 26 22   Temp:   97.1  F (36.2  C)   SpO2: 99% 99%          Electronically Signed By: Goldy Rodriguez MD  September 6, 2019  5:03 AM

## 2019-09-06 NOTE — ANESTHESIA CARE TRANSFER NOTE
Patient: John Lamar    Procedure(s):  APPENDECTOMY, LAPAROSCOPIC    Diagnosis: appendicitis  Diagnosis Additional Information: No value filed.    Anesthesia Type:   General, RSI, ETT     Note:  Airway :Face Mask  Patient transferred to:PACU  Comments: Report and signed off to RN in PACU.  Good Resps, skin pink, VSS, O2 via face tent.      Vitals: (Last set prior to Anesthesia Care Transfer)    CRNA VITALS  9/6/2019 0136 - 9/6/2019 0210      9/6/2019             NIBP:  125/74    NIBP Mean:  107                Electronically Signed By: JONAH Gilmore CRNA  September 6, 2019  2:10 AM

## 2019-09-06 NOTE — DISCHARGE INSTRUCTIONS
HOME CARE FOLLOWING APPENDECTOMY  HERLINDA Tobar, CARLOS ALBERTO Mejia R. O Donnell, J. Shaheen    INCISIONAL CARE:  Replace the bandage over your incision (or incisions) until all drainage stops, or if more comfortable to have in place.  If present, leave the steri-strips (white paper tapes) in place for 14 days after surgery.  If you have staples in your incision at the time of discharge, they will be removed at your follow-up appointment.  If Dermabond (a type of skin glue) is present, leave in place until it wears/flakes off.     BATHING:  Avoid baths for 1 week after surgery.  Showers are okay.  You may wash your hair at any time.  Gently pat your incision dry after bathing.    ACTIVITY:  Light Activity -- you may immediately be up and about as tolerated.  Driving -- you may drive when comfortable and off narcotic pain medications.  Light Work -- resume when comfortable off pain medications.  (If you can drive, you probably can work.)  Strenuous Work/Activity -- limit lifting to 20 pounds for 1 week.  Progressively increase with time.  Active Sports (running, biking, etc.) -- cautiously resume after 2 weeks.    DISCOMFORT:  Use pain medications as prescribed by your surgeon.  Take the pain medication with some food, when possible, to minimize side effects.  Intermittent use of ice packs at the incision sites may help during the first 48 hours.  Expect gradual improvement.    DIET:  No restrictions.  Drink plenty of fluids.  While taking pain medications, increase dietary fiber or add a fiber supplementation like Metamucil or Citrucel to help prevent constipation - a possible side effect of pain medications.    NAUSEA:  If nauseated from the anesthetic/pain meds; rest in bed, get up cautiously with assistance, and drink clear liquids (juice, tea, broth).    RETURN APPOINTMENT:  Schedule a follow-up visit 2-3 weeks post-op.  Office Phone:  [phone]     CONTACT US IF THE FOLLOWING  DEVELOPS:   1. A fever that is above 101     2. If there is a large amount of drainage, bleeding, or swelling.   3. Severe pain that is not relieved by your prescription.   4. Drainage that is thick, cloudy, yellow, green or white.   5. Any other questions not answered by  Frequently Asked Questions  sheet.      FREQUENTLY ASKED QUESTIONS:    Q:  How should my incision look?    A:  Normally your incision will appear slightly swollen with light redness directly along the incision itself as it heals.  It may feel like a bump or ridge as the healing/scarring happens, and over time (3-4 months) this bump or ridge feeling should slowly go away.  In general, clear or pink watery drainage can be normal at first as your incision heals, but should decrease over time.    Q:  How do I know if my incision is infected?  A:  Look at your incision for signs of infection, like redness around the incision spreading to surrounding skin, or drainage of cloudy or foul-smelling drainage.  If you feel warm, check your temperature to see if you are running a fever.    **If any of these things occur, please notify the nurse at our office.  We may need you to come into the office for an incision check.      Q:  How do I take care of my incision?  A:  If you have a dressing in place - Starting the day after surgery, replace the dressing 1-2 times a day until there is no further drainage from the incision.  At that time, a dressing is no longer needed.  Try to minimize tape on the skin if irritation is occurring at the tape sites.  If you have significant irritation from tape on the skin, please call the office to discuss other method of dressing your incision.    Small pieces of tape called  steri-strips  may be present directly overlying your incision; these may be removed 10 days after surgery unless otherwise specified by your surgeon.  If these tapes start to loosen at the ends, you may trim them back until they fall off or are removed.     A:  If you had  Dermabond  tissue glue used as a dressing (this causes your incision to look shiny with a clear covering over it) - This type of dressing wears off with time and does not require more dressings over the top unless it is draining around the glue as it wears off.  Do not apply ointments or lotions over the incisions until the glue has completely worn off.    Q:  There is a piece of tape or a sticky  lead  still on my skin.  Can I remove this?  A:  Sometimes the sticky  leads  used for monitoring during surgery or for evaluation in the emergency department are not all removed while you are in the hospital.  These sometimes have a tab or metal dot on them.  You can easily remove these on your own, like taking off a band-aid.  If there is a gel substance under the  lead , simply wipe/clean it off with a washcloth or paper towel.      Q:  What can I do to minimize constipation (very hard stools, or lack of stools)?  A:  Stay well hydrated.  Increase your dietary fiber intake or take a fiber supplement -with plenty of water.  Walk around frequently.  You may consider an over-the-counter stool-softener.  Your Pharmacist can assist you with choosing one that is stocked at your pharmacy.  Constipation is also one of the most common side effects of pain medication.  If you are using pain medication, be pro-active and try to PREVENT problems with constipation by taking the steps above BEFORE constipation becomes a problem.    Q:  What do I do if I need more pain medications?  A:  Call the office to receive refills.  Be aware that certain pain meds cannot be called into a pharmacy and actually require a paper prescription.  A change may be made in your pain med as you progress thru your recovery period or if you have side effects to certain meds.    --Pain meds are NOT refilled after 5pm on weekdays, and NOT AT ALL on the weekends, so please look ahead to prevent problems.      Q:  Why am I having a hard time  sleeping now that I am at home?  A:  Many medications you receive while you are in the hospital can impact your sleep for a number of days after your surgery/hospitalization.  Decreased level of activity and naps during the day may also make sleeping at night difficult.  Try to minimize day-time naps, and get up frequently during the day to walk around your home during your recovery time.  Sleep aides may be of some help, but are not recommended for long-term use.      Q:  I am having some back discomfort.  What should I do?  A:  This may be related to certain positioning that was required for your surgery, extended periods of time in bed, or other changes in your overall activity level.  You may try ice, heat, acetaminophen, or ibuprofen to treat this temporarily.  Note that many pain medications have acetaminophen in them and would state this on the prescription bottle.  Be sure not to exceed the maximum of 4000mg per day of acetaminophen.     **If the pain you are having does not resolve, is severe, or is a flare of back pain you have had on other occasions prior to surgery, please contact your primary physician for further recommendations or for an appointment to be examined at their office.    Q:  Why am I having headaches?  A:  Headaches can be caused by many things:  caffeine withdrawal, use of pain meds, dehydration, high blood pressure, lack of sleep, over-activity/exhaustion, flare-up of usual migraine headaches.  If you feel this is related to muscle tension (a band-like feeling around the head, or a pressure at the low-back of the head) you may try ice or heat to this area.  You may need to drink more fluids (try electrolyte drink like Gatorade), rest, or take your usual migraine medications.   **If your headaches do not resolve, worsen, are accompanied by other symptoms, or if your blood pressure is high, please call your primary physician for recommendation and/or examination.    Q:  I am unable to  urinate.  What do I do?  A:  A small percentage of people can have difficulty urinating initially after surgery.  This includes being able to urinate only a very small amount at a time and feeling discomfort or pressure in the very low abdomen.  This is called  urinary retention , and is actually an urgent situation.  Proceed to your nearest Emergency department for evaluation (not an Urgent Care Center).  Sometimes the bladder does not work correctly after certain medications you receive during surgery, or related to certain procedures.  You may need to have a catheter placed until your bladder recovers.  When planning to go to an Emergency department, it may help to call the ER to let them know you are coming in for this problem after a surgery.  This may help you get in quicker to be evaluated.  **If you have symptoms of a urinary tract infection, please contact your primary physician for the proper evaluation and treatment.          If you have other questions, please call the office Monday thru Friday between 8am and 5pm to discuss with the nurse or physician assistant.  #[phone]    There is a surgeon ON CALL on weekday evenings and over the weekend in case of urgent need only, and may be contacted at the same number.    If you are having an emergency, call 911 or proceed to your nearest emergency department.  GENERAL ANESTHESIA OR SEDATION ADULT DISCHARGE INSTRUCTIONS   SPECIAL PRECAUTIONS FOR 24 HOURS AFTER SURGERY    IT IS NOT UNUSUAL TO FEEL LIGHT-HEADED OR FAINT, UP TO 24 HOURS AFTER SURGERY OR WHILE TAKING PAIN MEDICATION.  IF YOU HAVE THESE SYMPTOMS; SIT FOR A FEW MINUTES BEFORE STANDING AND HAVE SOMEONE ASSIST YOU WHEN YOU GET UP TO WALK OR USE THE BATHROOM.    YOU SHOULD REST AND RELAX FOR THE NEXT 24 HOURS AND YOU MUST MAKE ARRANGEMENTS TO HAVE SOMEONE STAY WITH YOU FOR AT LEAST 24 HOURS AFTER YOUR DISCHARGE.  AVOID HAZARDOUS AND STRENUOUS ACTIVITIES.  DO NOT MAKE IMPORTANT DECISIONS FOR 24  HOURS.    DO NOT DRIVE ANY VEHICLE OR OPERATE MECHANICAL EQUIPMENT FOR 24 HOURS FOLLOWING THE END OF YOUR SURGERY.  EVEN THOUGH YOU MAY FEEL NORMAL, YOUR REACTIONS MAY BE AFFECTED BY THE MEDICATION YOU HAVE RECEIVED.    DO NOT DRINK ALCOHOLIC BEVERAGES FOR 24 HOURS FOLLOWING YOUR SURGERY.    DRINK CLEAR LIQUIDS (APPLE JUICE, GINGER ALE, 7-UP, BROTH, ETC.).  PROGRESS TO YOUR REGULAR DIET AS YOU FEEL ABLE.    YOU MAY HAVE A DRY MOUTH, A SORE THROAT, MUSCLES ACHES OR TROUBLE SLEEPING.  THESE SHOULD GO AWAY AFTER 24 HOURS.    CALL YOUR DOCTOR FOR ANY OF THE FOLLOWING:  SIGNS OF INFECTION (FEVER, GROWING TENDERNESS AT THE SURGERY SITE, A LARGE AMOUNT OF DRAINAGE OR BLEEDING, SEVERE PAIN, FOUL-SMELLING DRAINAGE, REDNESS OR SWELLING.    IT HAS BEEN OVER 8 TO 10 HOURS SINCE SURGERY AND YOU ARE STILL NOT ABLE TO URINATE (PASS WATER).   Maximum acetaminophen (Tylenol) dose from all sources should not exceed 4 grams (4000 mg) per day.  You received Toradol, an IV form of ibuprofen (Motrin) at 3:15 AM.  Do not take any ibuprofen products until 9:15 AM.  You picked up your prescription for oxycodone at the hospital before discharge.

## 2019-09-06 NOTE — H&P
[facility] - H&P     John Lamar MRN# [id number]   Age: 24 year old YOB: 1995     HPI:  Patient has been experiencing acute RLQ abdominal pain for the last 15 hours.  This began diffusely and has gradually focused into the right lower quadrant.  He presented to the emergency room, where CT scan showed findings consistent with acute appendicitis.  He has had some mild decrease in his appetite.      History is obtained from the patient    Review Of Systems:  Respiratory:  Shortness of breath-with exercise.  Cardiovascular: Gets tired with exercise.  Gastrointestinal: as above  Genitourinary: negative  All remaining review of systems negative except as stated in HPI.    PMH:  Past Medical History:   Diagnosis Date     ADHD (attention deficit hyperactivity disorder)      Arthritis        PSH:  History reviewed. No pertinent surgical history.    Allergies:  Allergies   Allergen Reactions     Sulfa Drugs Rash       Home Medications:  No current outpatient medications on file.       Social History:  Social History     Tobacco Use     Smoking status: Never Smoker     Smokeless tobacco: Never Used   Substance Use Topics     Alcohol use: Yes     Drug use: No       Family History:  History reviewed. No pertinent family history.     Physical Exam:  /62   Pulse 90   Temp 98.7  F (37.1  C) (Oral)   Resp 18   Wt 99.8 kg (220 lb)   SpO2 93%   BMI 29.03 kg/m      General appearance: healthy, alert and mild distress.  Hydration: well hydrated  HEENT: normocephalic, atraumatic  Neck: no adenopathy  Lungs: normal and clear to auscultation  Heart: regular rate and rhythm and no murmurs, clicks, or gallops  Abdomen: flat, normal bowel sounds. Tenderness: present: RLQ moderate.  Masses: none.  Organomegaly: none  Skin: clear without rashes/lesions  Extremities: no gross deformities  Neuro: oriented to time & place, moves all extremities with normal strength, speech  clear    Labs Reviewed:  Lab Results   Component Value Date    WBC 10.0 09/05/2019     Lab Results   Component Value Date    HGB 16.8 09/05/2019     Lab Results   Component Value Date     09/05/2019       Last Basic Metabolic Panel:  Lab Results   Component Value Date     09/05/2019      Lab Results   Component Value Date    POTASSIUM 3.9 09/05/2019     Lab Results   Component Value Date    CHLORIDE 108 09/05/2019     Lab Results   Component Value Date    ISRAEL 9.3 09/05/2019     Lab Results   Component Value Date    CO2 32 09/05/2019     Lab Results   Component Value Date    BUN 16 09/05/2019     Lab Results   Component Value Date    CR 0.96 09/05/2019     Lab Results   Component Value Date     09/05/2019         Radiology:  CT abdomen reveals a dilated, thick-walled appendix with surrounding fat stranding.  There is no obvious abscess or perforation.  All imaging studies reviewed by me.    ASSESSMENT/PLAN:  The patient's history, physical exam, laboratory and imaging studies are suspicious for acute appendicitis.  I have offered the patient laparoscopic appendectomy.  The risks, benefits, and alternatives have been discussed in detail.  All of the patient's questions have been answered.  They elect to proceed and we will go to the OR at the soonest availability.  Pre-operative antibiotics have been ordered.     Alfredo Guardado MD

## 2019-09-09 LAB — COPATH REPORT: NORMAL

## 2019-09-20 ENCOUNTER — OFFICE VISIT (OUTPATIENT)
Dept: SURGERY | Facility: CLINIC | Age: 24
End: 2019-09-20
Payer: COMMERCIAL

## 2019-09-20 VITALS
WEIGHT: 220 LBS | HEART RATE: 85 BPM | HEIGHT: 73 IN | DIASTOLIC BLOOD PRESSURE: 88 MMHG | RESPIRATION RATE: 16 BRPM | SYSTOLIC BLOOD PRESSURE: 130 MMHG | BODY MASS INDEX: 29.16 KG/M2 | OXYGEN SATURATION: 98 %

## 2019-09-20 DIAGNOSIS — Z09 SURGICAL FOLLOWUP VISIT: Primary | ICD-10-CM

## 2019-09-20 PROCEDURE — 99024 POSTOP FOLLOW-UP VISIT: CPT | Performed by: PHYSICIAN ASSISTANT

## 2019-09-20 ASSESSMENT — MIFFLIN-ST. JEOR: SCORE: 2041.79

## 2019-09-20 NOTE — PROGRESS NOTES
9/20/2019    [facility] Note     Subjective:  John Lamar is here for his first postoperative visit. He underwent laparoscopic appendectomy by Dr. Guardado on 9/6/19. Today he  tells me he has been feeling well since surgery. He currently does not require narcotic pain medications, he is eating a normal diet and his bowels are regular. He has no concerns today.    Objective:  Abd - Abdomen soft, non-tender.   Inc - Healing well, well approximated and without signs of infection    Assessment:  S/p laparoscopic appendectomy. The pathology confirms acute appendicitis.    Plan:  RTC PRN      Miroslava Johnson PA-C      Please route or send letter to:  Primary Care Provider (PCP)

## 2019-11-04 ENCOUNTER — HEALTH MAINTENANCE LETTER (OUTPATIENT)
Age: 24
End: 2019-11-04

## 2020-11-16 ENCOUNTER — HEALTH MAINTENANCE LETTER (OUTPATIENT)
Age: 25
End: 2020-11-16

## 2021-09-18 ENCOUNTER — HEALTH MAINTENANCE LETTER (OUTPATIENT)
Age: 26
End: 2021-09-18

## 2022-01-08 ENCOUNTER — HEALTH MAINTENANCE LETTER (OUTPATIENT)
Age: 27
End: 2022-01-08

## 2022-06-04 ENCOUNTER — HOSPITAL ENCOUNTER (EMERGENCY)
Facility: CLINIC | Age: 27
Discharge: HOME | End: 2022-06-04
Attending: EMERGENCY MEDICINE | Admitting: EMERGENCY MEDICINE
Payer: COMMERCIAL

## 2022-06-04 VITALS
OXYGEN SATURATION: 96 % | SYSTOLIC BLOOD PRESSURE: 128 MMHG | DIASTOLIC BLOOD PRESSURE: 83 MMHG | HEART RATE: 74 BPM | RESPIRATION RATE: 20 BRPM | TEMPERATURE: 97 F

## 2022-06-04 DIAGNOSIS — R29.898 ARM HEAVINESS: ICD-10-CM

## 2022-06-04 DIAGNOSIS — R20.2 NUMBNESS AND TINGLING IN BOTH HANDS: ICD-10-CM

## 2022-06-04 DIAGNOSIS — R20.0 NUMBNESS AND TINGLING IN BOTH HANDS: ICD-10-CM

## 2022-06-04 DIAGNOSIS — R20.2 TINGLING OF BOTH FEET: ICD-10-CM

## 2022-06-04 LAB
ANION GAP SERPL CALCULATED.3IONS-SCNC: 4 MMOL/L (ref 3–14)
BASOPHILS # BLD AUTO: 0 10E3/UL (ref 0–0.2)
BASOPHILS NFR BLD AUTO: 0 %
BUN SERPL-MCNC: 13 MG/DL (ref 7–30)
CALCIUM SERPL-MCNC: 9.2 MG/DL (ref 8.5–10.1)
CHLORIDE BLD-SCNC: 105 MMOL/L (ref 94–109)
CO2 SERPL-SCNC: 31 MMOL/L (ref 20–32)
CREAT SERPL-MCNC: 0.92 MG/DL (ref 0.66–1.25)
EOSINOPHIL # BLD AUTO: 0.1 10E3/UL (ref 0–0.7)
EOSINOPHIL NFR BLD AUTO: 1 %
ERYTHROCYTE [DISTWIDTH] IN BLOOD BY AUTOMATED COUNT: 12.5 % (ref 10–15)
GFR SERPL CREATININE-BSD FRML MDRD: >90 ML/MIN/1.73M2
GLUCOSE BLD-MCNC: 104 MG/DL (ref 70–99)
HCT VFR BLD AUTO: 45.5 % (ref 40–53)
HGB BLD-MCNC: 15.6 G/DL (ref 13.3–17.7)
IMM GRANULOCYTES # BLD: 0 10E3/UL
IMM GRANULOCYTES NFR BLD: 0 %
LYMPHOCYTES # BLD AUTO: 1.8 10E3/UL (ref 0.8–5.3)
LYMPHOCYTES NFR BLD AUTO: 38 %
MCH RBC QN AUTO: 29.8 PG (ref 26.5–33)
MCHC RBC AUTO-ENTMCNC: 34.3 G/DL (ref 31.5–36.5)
MCV RBC AUTO: 87 FL (ref 78–100)
MONOCYTES # BLD AUTO: 0.3 10E3/UL (ref 0–1.3)
MONOCYTES NFR BLD AUTO: 6 %
NEUTROPHILS # BLD AUTO: 2.5 10E3/UL (ref 1.6–8.3)
NEUTROPHILS NFR BLD AUTO: 55 %
NRBC # BLD AUTO: 0 10E3/UL
NRBC BLD AUTO-RTO: 0 /100
PLATELET # BLD AUTO: 213 10E3/UL (ref 150–450)
POTASSIUM BLD-SCNC: 3.8 MMOL/L (ref 3.4–5.3)
RBC # BLD AUTO: 5.24 10E6/UL (ref 4.4–5.9)
SODIUM SERPL-SCNC: 140 MMOL/L (ref 133–144)
TSH SERPL DL<=0.005 MIU/L-ACNC: 1.26 MU/L (ref 0.4–4)
WBC # BLD AUTO: 4.7 10E3/UL (ref 4–11)

## 2022-06-04 PROCEDURE — 85014 HEMATOCRIT: CPT | Performed by: EMERGENCY MEDICINE

## 2022-06-04 PROCEDURE — 99284 EMERGENCY DEPT VISIT MOD MDM: CPT

## 2022-06-04 PROCEDURE — 93005 ELECTROCARDIOGRAM TRACING: CPT

## 2022-06-04 PROCEDURE — 80048 BASIC METABOLIC PNL TOTAL CA: CPT | Performed by: EMERGENCY MEDICINE

## 2022-06-04 PROCEDURE — 84443 ASSAY THYROID STIM HORMONE: CPT | Performed by: EMERGENCY MEDICINE

## 2022-06-04 PROCEDURE — 36415 COLL VENOUS BLD VENIPUNCTURE: CPT | Performed by: EMERGENCY MEDICINE

## 2022-06-04 NOTE — ED TRIAGE NOTES
Left knee throbbing on Monday. Then scattered tingling/numbness going through his body. Right forearm up to shoulder numb since last night. Arm feels heavy.

## 2022-06-04 NOTE — DISCHARGE INSTRUCTIONS
You should make an appointment to follow-up in primary care, they are supposed to call you to help facilitate this.  I would keep a journal or log of the symptoms you are noticing, if they rapidly worsen, you should return to the emergency department.  Certainly with your career, I recommend as much stress mitigation as you can, I would limit or reduce drinking to a minimum to help avoid any confounding variables, I would avoid or limit your caffeine as well.

## 2022-06-04 NOTE — ED PROVIDER NOTES
History     Chief Complaint:  Numbness       HPI   John Lamar is a 27 year old male no past medical history presenting for evaluation of several days of bilateral hand and feet tingling who subsequently developed right forearm tingling yesterday.  No reported falls trips or stumbles.  He denies weakness, numbness or vision changes.  He reports his arm feels heavy but reports no weakness.  Denies any chest pain, chest pressure or shortness of breath.  No headaches, vision changes, difficulty walking.    ROS:  Review of Systems  10 point ROS completed, negative except as indicated in the HPI.      Allergies:  Sulfa Drugs     Medications:    hydrOXYzine (ATARAX) 25 MG tablet  Methylphenidate HCl (CONCERTA PO)  mupirocin (BACTROBAN) 2 % external ointment        Past Medical History:    Past Medical History:   Diagnosis Date     ADHD (attention deficit hyperactivity disorder)      Arthritis        Past Surgical History:    Past Surgical History:   Procedure Laterality Date     LAPAROSCOPIC APPENDECTOMY N/A 9/6/2019    Procedure: APPENDECTOMY, LAPAROSCOPIC;  Surgeon: Alfredo Guardado MD;  Location:  OR        Family History:    family history is not on file.    Social History:   reports that he has never smoked. He has never used smokeless tobacco. He reports current alcohol use. He reports that he does not use drugs.  PCP: No Ref-Primary, Physician     Physical Exam     Patient Vitals for the past 24 hrs:   BP Temp Temp src Pulse Resp SpO2   06/04/22 1715 (!) 143/85 -- -- 80 -- 98 %   06/04/22 1658 (!) 135/94 97  F (36.1  C) Temporal 82 20 99 %        Physical Exam  Constitutional: Alert, attentive, GCS 15   Eyes: EOM are normal, anicteric, conjugate gaze  CV: regular rate and rhythm; no murmurs  Chest: Effort normal and breath sounds clear without wheezing or rales, symmetric bilaterally   GI:  non tender. No distension. No guarding or rebound.    MSK: No LE edema, no tenderness to palpation of  BLE.  Neurological:   GCS 15; A/Ox3; Cranial nerves 2-12 intact;   5/5 strength throughout the upper and lower extremities;   sensation intact to light touch throughout the upper and lower extremities;   2+ DTRs to the bilateral upper and lower extremities (biceps, BRs, patellar, achilles);   normal fine motor coordination intact bilaterally;   normal gait   No dysdiadochokinesia  Negative Romberg  Skin: Skin is warm and dry.      Emergency Department Course   ECG:  ECG results from 22   EKG 12 lead     Value    Systolic Blood Pressure     Diastolic Blood Pressure     Ventricular Rate 70    Atrial Rate 70    RI Interval 142    QRS Duration 102        QTc 414    P Axis 27    R AXIS 19    T Axis 8    Interpretation ECG      Sinus rhythm  Cannot rule out Anterior infarct , age undetermined  Abnormal ECG  No previous ECGs available        Laboratory:  Labs Ordered and Resulted from Time of ED Arrival to Time of ED Departure   BASIC METABOLIC PANEL - Abnormal       Result Value    Sodium 140      Potassium 3.8      Chloride 105      Carbon Dioxide (CO2) 31      Anion Gap 4      Urea Nitrogen 13      Creatinine 0.92      Calcium 9.2      Glucose 104 (*)     GFR Estimate >90     TSH WITH FREE T4 REFLEX - Normal    TSH 1.26     CBC WITH PLATELETS AND DIFFERENTIAL    WBC Count 4.7      RBC Count 5.24      Hemoglobin 15.6      Hematocrit 45.5      MCV 87      MCH 29.8      MCHC 34.3      RDW 12.5      Platelet Count 213      % Neutrophils 55      % Lymphocytes 38      % Monocytes 6      % Eosinophils 1      % Basophils 0      % Immature Granulocytes 0      NRBCs per 100 WBC 0      Absolute Neutrophils 2.5      Absolute Lymphocytes 1.8      Absolute Monocytes 0.3      Absolute Eosinophils 0.1      Absolute Basophils 0.0      Absolute Immature Granulocytes 0.0      Absolute NRBCs 0.0           Disposition:  The patient was discharged to home.     Impression & Plan      Medical Decision Makin-year-old male  without significant past medical history reporting numbness and tingling to his bilateral hands and feet, some through his body as well as right forearm and shoulder tingling that with heaviness.  He denies any other focal neurologic deficits including weakness, vision changes trouble walking.  His neurologic exam is normal other than subjective tingling to his forearms feet and toes.  His history is not suggestive of CVA, his labs including blood sugar here are unremarkable, he does endorse of some heavier drinking the night prior to his arm heaviness however does not think he fell asleep on his arm.  With unremarkable labs, reassuring exam for no medical emergency I did recommend follow-up in primary care clinic, continue to monitoring his symptoms, and to return should they aggressively worsen.  I did review with him certainly though unlikely could be some type of autoimmune the less likely rheumatologic condition.  I did review with him importance of good rest, and minimization of stress.    Diagnosis:    ICD-10-CM    1. Arm heaviness  R29.898 Primary Care Referral   2. Numbness and tingling in both hands  R20.0 Primary Care Referral    R20.2    3. Tingling of both feet  R20.2 Primary Care Referral      Hay Martinez MD  [employer]  6:47 PM 06/04/22          Hay Martinez MD  06/04/22 [id number]

## 2022-06-05 ENCOUNTER — DOCUMENTATION ONLY (OUTPATIENT)
Dept: OTHER | Facility: CLINIC | Age: 27
End: 2022-06-05
Payer: COMMERCIAL

## 2022-06-06 LAB
ATRIAL RATE - MUSE: 70 BPM
DIASTOLIC BLOOD PRESSURE - MUSE: NORMAL MMHG
INTERPRETATION ECG - MUSE: NORMAL
P AXIS - MUSE: 27 DEGREES
PR INTERVAL - MUSE: 142 MS
QRS DURATION - MUSE: 102 MS
QT - MUSE: 384 MS
QTC - MUSE: 414 MS
R AXIS - MUSE: 19 DEGREES
SYSTOLIC BLOOD PRESSURE - MUSE: NORMAL MMHG
T AXIS - MUSE: 8 DEGREES
VENTRICULAR RATE- MUSE: 70 BPM

## 2022-06-27 ENCOUNTER — OFFICE VISIT (OUTPATIENT)
Dept: PEDIATRICS | Facility: CLINIC | Age: 27
End: 2022-06-27
Attending: EMERGENCY MEDICINE
Payer: COMMERCIAL

## 2022-06-27 VITALS
SYSTOLIC BLOOD PRESSURE: 124 MMHG | WEIGHT: 249.3 LBS | DIASTOLIC BLOOD PRESSURE: 80 MMHG | RESPIRATION RATE: 18 BRPM | TEMPERATURE: 97.2 F | BODY MASS INDEX: 32.89 KG/M2 | HEART RATE: 79 BPM | OXYGEN SATURATION: 98 %

## 2022-06-27 DIAGNOSIS — R20.2 TINGLING OF BOTH FEET: ICD-10-CM

## 2022-06-27 DIAGNOSIS — F90.9 ATTENTION DEFICIT HYPERACTIVITY DISORDER (ADHD), UNSPECIFIED ADHD TYPE: ICD-10-CM

## 2022-06-27 DIAGNOSIS — R20.0 NUMBNESS AND TINGLING IN BOTH HANDS: Primary | ICD-10-CM

## 2022-06-27 DIAGNOSIS — Z11.59 NEED FOR HEPATITIS C SCREENING TEST: ICD-10-CM

## 2022-06-27 DIAGNOSIS — Z11.4 SCREENING FOR HIV (HUMAN IMMUNODEFICIENCY VIRUS): ICD-10-CM

## 2022-06-27 DIAGNOSIS — R20.2 NUMBNESS AND TINGLING IN BOTH HANDS: Primary | ICD-10-CM

## 2022-06-27 LAB — HBA1C MFR BLD: 5.1 % (ref 0–5.6)

## 2022-06-27 PROCEDURE — 99204 OFFICE O/P NEW MOD 45 MIN: CPT | Performed by: STUDENT IN AN ORGANIZED HEALTH CARE EDUCATION/TRAINING PROGRAM

## 2022-06-27 PROCEDURE — 83036 HEMOGLOBIN GLYCOSYLATED A1C: CPT | Performed by: STUDENT IN AN ORGANIZED HEALTH CARE EDUCATION/TRAINING PROGRAM

## 2022-06-27 PROCEDURE — 82607 VITAMIN B-12: CPT | Performed by: STUDENT IN AN ORGANIZED HEALTH CARE EDUCATION/TRAINING PROGRAM

## 2022-06-27 PROCEDURE — 36415 COLL VENOUS BLD VENIPUNCTURE: CPT | Performed by: STUDENT IN AN ORGANIZED HEALTH CARE EDUCATION/TRAINING PROGRAM

## 2022-06-27 PROCEDURE — 87389 HIV-1 AG W/HIV-1&-2 AB AG IA: CPT | Performed by: STUDENT IN AN ORGANIZED HEALTH CARE EDUCATION/TRAINING PROGRAM

## 2022-06-27 PROCEDURE — 80076 HEPATIC FUNCTION PANEL: CPT | Performed by: STUDENT IN AN ORGANIZED HEALTH CARE EDUCATION/TRAINING PROGRAM

## 2022-06-27 PROCEDURE — 86803 HEPATITIS C AB TEST: CPT | Performed by: STUDENT IN AN ORGANIZED HEALTH CARE EDUCATION/TRAINING PROGRAM

## 2022-06-27 RX ORDER — METHYLPHENIDATE HYDROCHLORIDE 36 MG/1
36 TABLET ORAL DAILY
Qty: 30 TABLET | Refills: 0 | Status: SHIPPED | OUTPATIENT
Start: 2022-07-28 | End: 2022-06-27

## 2022-06-27 RX ORDER — METHYLPHENIDATE HYDROCHLORIDE 36 MG/1
36 TABLET ORAL DAILY
Qty: 30 TABLET | Refills: 0 | Status: SHIPPED | OUTPATIENT
Start: 2022-06-27 | End: 2022-06-27

## 2022-06-27 RX ORDER — METHYLPHENIDATE HYDROCHLORIDE 36 MG/1
36 TABLET ORAL DAILY
Qty: 30 TABLET | Refills: 0 | Status: SHIPPED | OUTPATIENT
Start: 2022-08-28 | End: 2022-08-05

## 2022-06-27 RX ORDER — METHYLPHENIDATE HYDROCHLORIDE 36 MG/1
36 TABLET ORAL DAILY
Qty: 30 TABLET | Refills: 0 | Status: SHIPPED | OUTPATIENT
Start: 2022-07-28 | End: 2022-08-05

## 2022-06-27 RX ORDER — METHYLPHENIDATE HYDROCHLORIDE 36 MG/1
36 TABLET ORAL DAILY
Qty: 30 TABLET | Refills: 0 | Status: SHIPPED | OUTPATIENT
Start: 2022-08-28 | End: 2022-06-27

## 2022-06-27 RX ORDER — METHYLPHENIDATE HYDROCHLORIDE 36 MG/1
36 TABLET ORAL DAILY
Qty: 30 TABLET | Refills: 0 | Status: SHIPPED | OUTPATIENT
Start: 2022-06-27 | End: 2023-01-06

## 2022-06-27 ASSESSMENT — PAIN SCALES - GENERAL: PAINLEVEL: NO PAIN (0)

## 2022-06-27 NOTE — PROGRESS NOTES
"Assessment & Plan   Numbness and tingling in both hands  Tingling of both feet  Differential diagnosis includes paraesthesias, anxiety, vitamin B 12 deficiency. Will screen for diabetes mellitus/prediabetes and any liver abnormalities. Given location and persistence demyelinating process cannot be excluded; discussed obtaining brain MRI with patient and he is in favor of proceeding so ordered to rule out process such as multiple sclerosis. On no medications that should be causing side effects of paresthesias. If normal work up, consider stress/anxiety.  - Hemoglobin A1c; Future  - Vitamin B12; Future  - Hepatic panel (Albumin, ALT, AST, Bili, Alk Phos, TP); Future  - MR Brain w/o & w Contrast; Future  - Hemoglobin A1c  - Vitamin B12  - Hepatic panel (Albumin, ALT, AST, Bili, Alk Phos, TP)    Screening for HIV (human immunodeficiency virus)  - HIV Antigen Antibody Combo; Future    Need for hepatitis C screening test  - Hepatitis C Screen Reflex to HCV RNA Quant and Genotype; Future    Attention deficit hyperactivity disorder (ADHD), unspecified ADHD type  Resume Concerta, but at lower dose of 36mg daily to see if will help improve inattentive symptoms at work.       Return in about 6 months (around 12/27/2022) for Annual Wellness Exam.   -ADHD follow up    Ban Lugo MD  [facility] [place] Lopez is a 27 year old, presenting for the following health issues:  Hospital F/U      HPI     ED/UC Followup:  Facility:  [facility] [place]  Date of visit: 06/04/2022  Reason for visit: Numbness  Current Status: numbness still present, feeling of \"pins and needles\" is intermittent but becoming more frequent.      Wt Readings from Last 4 Encounters:   06/27/22 113.1 kg (249 lb 4.8 oz)   09/20/19 99.8 kg (220 lb)   09/05/19 99.8 kg (220 lb)   07/04/19 101.6 kg (224 lb)       Also history of ADHD and was on concerta up to 72mg daily at one point  Hasnt had primary care provider so hasnt " had refilled        Objective    /80 (BP Location: Right arm, Patient Position: Sitting, Cuff Size: Adult Large)   Pulse 79   Temp 97.2  F (36.2  C) (Temporal)   Resp 18   Wt 113.1 kg (249 lb 4.8 oz)   SpO2 98%   BMI 32.89 kg/m    Body mass index is 32.89 kg/m .  Physical Exam   GEN: Alert and appropriately interactive for age  EYES: Eyes grossly normal to inspection, conjunctivae and sclerae normal  RESP: Breathing comfortably on room air   CV: Warm and well perfused peripheral extremities   MS: no gross musculoskeletal defects noted, no edema  NEURO: Alert and oriented. Speech fluent.    PSYCH: Affect normal/bright. Appearance well groomed.

## 2022-06-27 NOTE — TELEPHONE ENCOUNTER
Patient calling stating he spoke with an MA at the clinic and asked to have rx switched to [facility] pharmacy. Patient states [facility] does not take his insurance. Please review and advise on refill request.     Alek PATE RN

## 2022-06-28 LAB
HCV AB SERPL QL IA: NONREACTIVE
HIV 1+2 AB+HIV1 P24 AG SERPL QL IA: NONREACTIVE
VIT B12 SERPL-MCNC: 367 PG/ML (ref 232–1245)

## 2022-06-29 LAB
ALBUMIN SERPL-MCNC: 4.3 G/DL (ref 3.4–5)
ALP SERPL-CCNC: 75 U/L (ref 40–150)
ALT SERPL W P-5'-P-CCNC: 40 U/L (ref 0–70)
AST SERPL W P-5'-P-CCNC: 21 U/L (ref 0–45)
BILIRUB DIRECT SERPL-MCNC: 0.1 MG/DL (ref 0–0.2)
BILIRUB SERPL-MCNC: 0.7 MG/DL (ref 0.2–1.3)
PROT SERPL-MCNC: 7.4 G/DL (ref 6.8–8.8)

## 2022-07-05 ENCOUNTER — ANCILLARY PROCEDURE (OUTPATIENT)
Dept: MRI IMAGING | Facility: CLINIC | Age: 27
End: 2022-07-05
Attending: STUDENT IN AN ORGANIZED HEALTH CARE EDUCATION/TRAINING PROGRAM
Payer: COMMERCIAL

## 2022-07-05 DIAGNOSIS — R20.0 NUMBNESS AND TINGLING IN BOTH HANDS: ICD-10-CM

## 2022-07-05 DIAGNOSIS — R20.2 NUMBNESS AND TINGLING IN BOTH HANDS: ICD-10-CM

## 2022-07-05 PROCEDURE — 255N000002 HC RX 255 OP 636: Performed by: STUDENT IN AN ORGANIZED HEALTH CARE EDUCATION/TRAINING PROGRAM

## 2022-07-05 PROCEDURE — A9585 GADOBUTROL INJECTION: HCPCS | Performed by: STUDENT IN AN ORGANIZED HEALTH CARE EDUCATION/TRAINING PROGRAM

## 2022-07-05 PROCEDURE — 70553 MRI BRAIN STEM W/O & W/DYE: CPT

## 2022-07-05 RX ORDER — GADOBUTROL 604.72 MG/ML
10 INJECTION INTRAVENOUS ONCE
Status: COMPLETED | OUTPATIENT
Start: 2022-07-05 | End: 2022-07-05

## 2022-07-05 RX ADMIN — GADOBUTROL 10 ML: 604.72 INJECTION INTRAVENOUS at 15:39

## 2022-07-11 ENCOUNTER — VIRTUAL VISIT (OUTPATIENT)
Dept: PEDIATRICS | Facility: CLINIC | Age: 27
End: 2022-07-11
Payer: COMMERCIAL

## 2022-07-11 DIAGNOSIS — R20.0 NUMBNESS AND TINGLING IN BOTH HANDS: Primary | ICD-10-CM

## 2022-07-11 DIAGNOSIS — R20.2 NUMBNESS AND TINGLING IN BOTH HANDS: Primary | ICD-10-CM

## 2022-07-11 DIAGNOSIS — R25.3 TWITCHING: ICD-10-CM

## 2022-07-11 PROCEDURE — 99213 OFFICE O/P EST LOW 20 MIN: CPT | Mod: 95 | Performed by: PEDIATRICS

## 2022-07-11 NOTE — PATIENT INSTRUCTIONS
Dear John,    We will refer you to neurology.    [facility] will call you to coordinate your care as prescribed by your provider. If you don't hear from a representative within 2 business days, please call [phone].    Take care,    Anneliese Hudson MD  Internal Medicine/Pediatrics  [facility]

## 2022-07-11 NOTE — PROGRESS NOTES
John is a 27 year old who is being evaluated via a billable video visit.      How would you like to obtain your AVS? MyChart  If the video visit is dropped, the invitation should be resent by: EPIC  Will anyone else be joining your video visit? No        Assessment & Plan     Numbness and tingling in both hands  Patient with new symptoms of muscle twiching since last visit.  He is very scared about a neurologic disorder.  Neg family history. Given testing and imaging to date, most likely this is from anxiety, stress, dehydration, sleep deprivation.  Recommend sleep, magnesium, hydration until he sees neurology.  If all clear from neurology, I mentioned to him that he should follow up with PCP for anxiety.   - Adult Neurology  Referral; Future    Twitching  As above  - Adult Neurology  Referral; Future             Patient Instructions   Dear John,    We will refer you to neurology.    [facility] will call you to coordinate your care as prescribed by your provider. If you don't hear from a representative within 2 business days, please call [phone].    Take care,    Anneliese Hudson MD  Internal Medicine/Pediatrics  [facility]        Return in about 6 months (around 1/11/2023) for Routine preventive with PCP.    Anneliese Hudson MD  [facility]    Cristin Lopez is a 27 year old, presenting for the following health issues:  No chief complaint on file.      History of Present Illness       Reason for visit:  Afraid I have ALS or some disease  Symptom onset:  1-2 weeks ago  Symptoms include:  Persistent twitching in left pinky  Symptom intensity:  Severe  Symptom progression:  Staying the same  Had these symptoms before:  No  What makes it worse:  When my hand is at rest, the pinky twitches constantly  What makes it better:  A neuro visit or an EMG to rule out ALS    He eats 0-1 servings of fruits and vegetables daily.He consumes 2 sweetened  "beverage(s) daily.He exercises with enough effort to increase his heart rate 9 or less minutes per day.  He exercises with enough effort to increase his heart rate 3 or less days per week. He is missing 1 dose(s) of medications per week.  He is not taking prescribed medications regularly due to other.     Patient is new to me.  Had OV with PCP on 6/27/22 - numbness/tingling both hands. Thorough lab workup with brain MRI.    Patient admits maybe he has health anxiety, but patient new symptoms of left lateral hand muscle twitching - constant.  Patient is right handed. Twitching has been constant since he has seen PCP.  Also twitching on side of leg, but most significant on left lateral hand. Also left arm feels heavy as well. Still has dexteriry, left arm feels \"slow.\"    Today patient still very concerned about symptoms. He admits to being scared and may be focusing on symptoms that aren't concerning.  He is an     Patient had large panic attack today because he read the notes from PCP.           Review of Systems         Objective           Vitals:  No vitals were obtained today due to virtual visit.    Physical Exam   GENERAL: Healthy, alert and no distress  EYES: Eyes grossly normal to inspection.  No discharge or erythema, or obvious scleral/conjunctival abnormalities.  RESP: No audible wheeze, cough, or visible cyanosis.  No visible retractions or increased work of breathing.    SKIN: Visible skin clear. No significant rash, abnormal pigmentation or lesions.  NEURO: Cranial nerves grossly intact.  Mentation and speech appropriate for age.  PSYCH: Mentation appears normal, affect normal/bright, judgement and insight intact, normal speech and appearance well-groomed.                Video-Visit Details    Video Start Time: 3:22pm    Type of service:  Video Visit    Video End Time:3:45 PM    Originating Location (pt. Location): Home    Distant Location (provider location):  [facility] " [place]     Platform used for Video Visit: Dedrick    .  Kadi.

## 2022-07-13 ENCOUNTER — OFFICE VISIT (OUTPATIENT)
Dept: NEUROLOGY | Facility: CLINIC | Age: 27
End: 2022-07-13
Payer: COMMERCIAL

## 2022-07-13 VITALS
SYSTOLIC BLOOD PRESSURE: 131 MMHG | DIASTOLIC BLOOD PRESSURE: 83 MMHG | WEIGHT: 249.34 LBS | HEART RATE: 86 BPM | BODY MASS INDEX: 33.05 KG/M2 | OXYGEN SATURATION: 99 % | HEIGHT: 73 IN

## 2022-07-13 DIAGNOSIS — R25.3 FASCICULATIONS: Primary | ICD-10-CM

## 2022-07-13 DIAGNOSIS — R20.2 PARESTHESIA OF UPPER AND LOWER EXTREMITIES OF BOTH SIDES: ICD-10-CM

## 2022-07-13 PROCEDURE — 99205 OFFICE O/P NEW HI 60 MIN: CPT | Performed by: PSYCHIATRY & NEUROLOGY

## 2022-07-13 NOTE — PROGRESS NOTES
"John Lamar is a 27 year old male who presents for:  Chief Complaint   Patient presents with     Consult     Was having numbness and tingling in hands bilateral, this is much improved but now he has started having muscle twitching         Initial Vitals:  /83 (BP Location: Right arm, Patient Position: Sitting, Cuff Size: Adult Large)   Pulse 86   Ht 1.854 m (6' 1\")   Wt 113.1 kg (249 lb 5.4 oz)   SpO2 99%   BMI 32.90 kg/m   Estimated body mass index is 32.9 kg/m  as calculated from the following:    Height as of this encounter: 1.854 m (6' 1\").    Weight as of this encounter: 113.1 kg (249 lb 5.4 oz).. Body surface area is 2.41 meters squared. BP completed using cuff size: mindi Jay    " Topical Sulfur Applications Pregnancy And Lactation Text: This medication is Pregnancy Category C and has an unknown safety profile during pregnancy. It is unknown if this topical medication is excreted in breast milk.

## 2022-07-13 NOTE — LETTER
"    7/13/2022         RE: John Lamar  [id number] [place] [facility] [facility] [id number]        Dear Colleague,    Thank you for referring your patient, John Lamar, to the [facility]. Please see a copy of my visit note below.    INITIAL NEUROLOGY CONSULTATION    DATE OF VISIT: 7/13/2022  CLINIC LOCATION: [facility]  MRN: [id number]  PATIENT NAME: John Lamar  YOB: 1995    REASON FOR VISIT:   Chief Complaint   Patient presents with     Consult     Was having numbness and tingling in hands bilateral, this is much improved but now he has started having muscle twitching      HISTORY OF PRESENT ILLNESS:                                                    Mr. John Lamar is 27 year old right handed male patient with past medical history of ADHD, who was seen today for concerns about muscle twitching/ALS.  The patient is accompanied by his girlfriend.    Per patient's report, approximately 1.5 to 2 months ago he developed tingling in his hands and feet that made him extremely concerned about possibility of multiple sclerosis and leading to worsening of his baseline anxiety.  Eventually, the patient was seen by primary care provider and underwent brain MRI with and without contrast on 7/5/2022, which was normal (images were personally reviewed and independently interpreted).    Approximately around June 27, 2022, the patient noticed intermittent muscle twitching/fasciculations of his left ADM that were triggered by left arm movements or fast typing.  I was able to observe it during today's visit and also reviewed the video, provided by the patient.  He feels that he also experiences occasional muscle twitching on the posterior surface of the left thigh, but did not notice it elsewhere.  Occasionally, his left upper extremity \"feels heavy\", but he denies any noticeable degree of persistent weakness.  He did not notice any muscle atrophies or other focal " "neurological symptoms.  His paresthesias has resolved.    The patient reports 4 to 6 hours of sleep every day.  He drinks 2 caffeinated beverages.  He rates his stress level as extreme/severe because he is very concerned about possibility of ALS related to his muscle twitching.    He had 1 concussion in high school playing football without long-term sequelae, but otherwise denies any prior history of significant head injuries, seizures, or CNS infections.  No pertinent positive family history, except stroke.    Laboratory evaluation from June 2022 includes unremarkable CMP, normal hemoglobin A1C (5.1), CBC, B12 (267), negative hep C/HIV, normal TSH (1.26).    No additional useful information is available in Care Everywhere, which was reviewed.  PAST MEDICAL/SURGICAL HISTORY:                                                    I personally reviewed patient's past medical and surgical history with the patient at today's visit.  MEDICATIONS:                                                    I personally reviewed patient's medications and allergies with the patient at today's visit.  ALLERGIES:                                                      Allergies   Allergen Reactions     Sulfa Drugs Rash     EXAM:                                                    VITAL SIGNS:   /83 (BP Location: Right arm, Patient Position: Sitting, Cuff Size: Adult Large)   Pulse 86   Ht 1.854 m (6' 1\")   Wt 113.1 kg (249 lb 5.4 oz)   SpO2 99%   BMI 32.90 kg/m    Mini-Cog Assessment:  Mini Cog Assessment  Clock Draw Score: 2 Normal  3 Item Recall: 3 objects recalled  Mini Cog Total Score: 5  Administered by: : Maame DEL TORO    General: pt is in NAD, cooperative.  Skin: normal turgor, moist mucous membranes, no lesions/rashes noticed.  HEENT: ATNC, EOMI, PERRL, white sclera, normal conjunctiva, no nystagmus or ptosis. No carotid bruits bilaterally.  Respiratory: lung sounds clear to auscultation bilaterally, no crackles, wheezes, " rhonchi. Symmetric lung excursion, no accessory respiratory muscle use.  Cardiovascular: normal S1/S2, no murmurs/rubs/gallops.   Abdomen: Not distended.  : deferred.    Neurological:  Mental: alert, follows commands, Mini Cog Total Score: 5/5 with 3/3 on memory recall, no aphasia or dysarthria. Fund of knowledge is appropriate for age.  Cranial Nerves:  CN II: visual acuity - able to accurately count fingers with each eye. Visual fields intact, fundi: discs sharp, no papilledema and normal vessels bilaterally.  CN III, IV, VI: EOM intact, pupils equal and reactive  CN V: facial sensation nl  CN VII: face symmetric, no facial droop  CN VIII: hearing normal  CN IX: palate elevation symmetric, uvula at midline  CN XI SCM normal, shoulder shrug nl  CN XII: tongue midline  Motor: Strength: 5/5 in all major groups of all extremities. Normal tone.  I witnessed several fasciculations in his left ADM, but not elsewhere.  No abnormal movements.  No muscle atrophy.  No pronator drift b/l.  Reflexes: Triceps, biceps, brachioradialis, patellar, and achilles reflexes normal and symmetric. No clonus noted. Toes are down-going b/l.   Sensory: light touch, pinprick, and vibration intact. Romberg: negative.  Coordination: FNF and heel-shin tests intact b/l.   Gait:  Normal, able to tandem walk without difficulty.  DATA:   LABS/EEG/IMAGING/OTHER STUDIES: I reviewed pertinent medical records, as detailed in the history of present illness.  ASSESSMENT AND PLAN:      ASSESSMENT: John Lamar is a 27 year old male patient with listed above past medical history, who presents with muscle twitching of the left hand that started at the end of June 2022 preceded by transient limb paresthesias in context of highly elevated stress/anxiety.    We had a detailed discussion with the patient and his girlfriend regarding his presenting complaints.  The neurological exam today is non-focal.  We reviewed his recent brain MRI images together.    We  discussed that most likely his current clinical presentation is consistent with benign fasciculations which could be seen with sleep deprivation, elevated stress/anxiety level, and excessive caffeine use.  The likelihood of motor neuron disease/ALS is quite low with preserved strength and absence of neurological exam changes.  We discussed the utility of obtaining EMG to evaluate further his fasciculations in his left hand, and decided to proceed with this test given the patient's apprehension/concerns.    DIAGNOSES:    ICD-10-CM    1. Fasciculations  R25.3 EMG   2. Paresthesia of upper and lower extremities of both sides  R20.2      PLAN: At today's visit we thoroughly discussed various diagnostic possibilities for patient's symptoms, necessary evaluation, and the plan, which includes:  Orders Placed This Encounter   Procedures     EMG     No new medications.     Additional recommendations after the work-up.    Next follow-up appointment is in the next 4 weeks or earlier if needed.    Total Time: 61 minutes spent on the date of the encounter doing chart review, history and exam, documentation and further activities per the note.    Dutch Armstrong MD  [facility]  (Chart documentation was completed in part with Dragon voice-recognition software. Even though reviewed, some grammatical, spelling, and word errors may remain.)              Again, thank you for allowing me to participate in the care of your patient.        Sincerely,        Dutch Armstrong MD

## 2022-07-13 NOTE — PATIENT INSTRUCTIONS
AFTER VISIT SUMMARY (AVS):    At today's visit we thoroughly discussed various diagnostic possibilities for your symptoms, necessary evaluation, and the plan, which includes:  Orders Placed This Encounter   Procedures    EMG     No new medications.     Additional recommendations after the work-up.    Next follow-up appointment is in the next 4 weeks or earlier if needed.    Please do not hesitate to call me with any questions or concerns.    Thanks.

## 2022-07-13 NOTE — PROGRESS NOTES
"INITIAL NEUROLOGY CONSULTATION    DATE OF VISIT: 7/13/2022  CLINIC LOCATION: [facility]  MRN: [id number]  PATIENT NAME: John Lamar  YOB: 1995    REASON FOR VISIT:   Chief Complaint   Patient presents with     Consult     Was having numbness and tingling in hands bilateral, this is much improved but now he has started having muscle twitching      HISTORY OF PRESENT ILLNESS:                                                    Mr. John Lamar is 27 year old right handed male patient with past medical history of ADHD, who was seen today for concerns about muscle twitching/ALS.  The patient is accompanied by his girlfriend.    Per patient's report, approximately 1.5 to 2 months ago he developed tingling in his hands and feet that made him extremely concerned about possibility of multiple sclerosis and leading to worsening of his baseline anxiety.  Eventually, the patient was seen by primary care provider and underwent brain MRI with and without contrast on 7/5/2022, which was normal (images were personally reviewed and independently interpreted).    Approximately around June 27, 2022, the patient noticed intermittent muscle twitching/fasciculations of his left ADM that were triggered by left arm movements or fast typing.  I was able to observe it during today's visit and also reviewed the video, provided by the patient.  He feels that he also experiences occasional muscle twitching on the posterior surface of the left thigh, but did not notice it elsewhere.  Occasionally, his left upper extremity \"feels heavy\", but he denies any noticeable degree of persistent weakness.  He did not notice any muscle atrophies or other focal neurological symptoms.  His paresthesias has resolved.    The patient reports 4 to 6 hours of sleep every day.  He drinks 2 caffeinated beverages.  He rates his stress level as extreme/severe because he is very concerned about possibility of ALS related to his " "muscle twitching.    He had 1 concussion in high school playing football without long-term sequelae, but otherwise denies any prior history of significant head injuries, seizures, or CNS infections.  No pertinent positive family history, except stroke.    Laboratory evaluation from June 2022 includes unremarkable CMP, normal hemoglobin A1C (5.1), CBC, B12 (267), negative hep C/HIV, normal TSH (1.26).    No additional useful information is available in Care Everywhere, which was reviewed.  PAST MEDICAL/SURGICAL HISTORY:                                                    I personally reviewed patient's past medical and surgical history with the patient at today's visit.  MEDICATIONS:                                                    I personally reviewed patient's medications and allergies with the patient at today's visit.  ALLERGIES:                                                      Allergies   Allergen Reactions     Sulfa Drugs Rash     EXAM:                                                    VITAL SIGNS:   /83 (BP Location: Right arm, Patient Position: Sitting, Cuff Size: Adult Large)   Pulse 86   Ht 1.854 m (6' 1\")   Wt 113.1 kg (249 lb 5.4 oz)   SpO2 99%   BMI 32.90 kg/m    Mini-Cog Assessment:  Mini Cog Assessment  Clock Draw Score: 2 Normal  3 Item Recall: 3 objects recalled  Mini Cog Total Score: 5  Administered by: : Maame DEL TORO    General: pt is in NAD, cooperative.  Skin: normal turgor, moist mucous membranes, no lesions/rashes noticed.  HEENT: ATNC, EOMI, PERRL, white sclera, normal conjunctiva, no nystagmus or ptosis. No carotid bruits bilaterally.  Respiratory: lung sounds clear to auscultation bilaterally, no crackles, wheezes, rhonchi. Symmetric lung excursion, no accessory respiratory muscle use.  Cardiovascular: normal S1/S2, no murmurs/rubs/gallops.   Abdomen: Not distended.  : deferred.    Neurological:  Mental: alert, follows commands, Mini Cog Total Score: 5/5 with 3/3 on memory " recall, no aphasia or dysarthria. Fund of knowledge is appropriate for age.  Cranial Nerves:  CN II: visual acuity - able to accurately count fingers with each eye. Visual fields intact, fundi: discs sharp, no papilledema and normal vessels bilaterally.  CN III, IV, VI: EOM intact, pupils equal and reactive  CN V: facial sensation nl  CN VII: face symmetric, no facial droop  CN VIII: hearing normal  CN IX: palate elevation symmetric, uvula at midline  CN XI SCM normal, shoulder shrug nl  CN XII: tongue midline  Motor: Strength: 5/5 in all major groups of all extremities. Normal tone.  I witnessed several fasciculations in his left ADM, but not elsewhere.  No abnormal movements.  No muscle atrophy.  No pronator drift b/l.  Reflexes: Triceps, biceps, brachioradialis, patellar, and achilles reflexes normal and symmetric. No clonus noted. Toes are down-going b/l.   Sensory: light touch, pinprick, and vibration intact. Romberg: negative.  Coordination: FNF and heel-shin tests intact b/l.   Gait:  Normal, able to tandem walk without difficulty.  DATA:   LABS/EEG/IMAGING/OTHER STUDIES: I reviewed pertinent medical records, as detailed in the history of present illness.  ASSESSMENT AND PLAN:      ASSESSMENT: John Lamar is a 27 year old male patient with listed above past medical history, who presents with muscle twitching of the left hand that started at the end of June 2022 preceded by transient limb paresthesias in context of highly elevated stress/anxiety.    We had a detailed discussion with the patient and his girlfriend regarding his presenting complaints.  The neurological exam today is non-focal.  We reviewed his recent brain MRI images together.    We discussed that most likely his current clinical presentation is consistent with benign fasciculations which could be seen with sleep deprivation, elevated stress/anxiety level, and excessive caffeine use.  The likelihood of motor neuron disease/ALS is quite low  with preserved strength and absence of neurological exam changes.  We discussed the utility of obtaining EMG to evaluate further his fasciculations in his left hand, and decided to proceed with this test given the patient's apprehension/concerns.    DIAGNOSES:    ICD-10-CM    1. Fasciculations  R25.3 EMG   2. Paresthesia of upper and lower extremities of both sides  R20.2      PLAN: At today's visit we thoroughly discussed various diagnostic possibilities for patient's symptoms, necessary evaluation, and the plan, which includes:  Orders Placed This Encounter   Procedures     EMG     No new medications.     Additional recommendations after the work-up.    Next follow-up appointment is in the next 4 weeks or earlier if needed.    Total Time: 61 minutes spent on the date of the encounter doing chart review, history and exam, documentation and further activities per the note.    Dutch Armstrong MD  [facility]  (Chart documentation was completed in part with Dragon voice-recognition software. Even though reviewed, some grammatical, spelling, and word errors may remain.)

## 2022-08-05 ENCOUNTER — OFFICE VISIT (OUTPATIENT)
Dept: PEDIATRICS | Facility: CLINIC | Age: 27
End: 2022-08-05
Payer: COMMERCIAL

## 2022-08-05 VITALS
TEMPERATURE: 97.9 F | SYSTOLIC BLOOD PRESSURE: 124 MMHG | BODY MASS INDEX: 32.74 KG/M2 | HEIGHT: 73 IN | RESPIRATION RATE: 16 BRPM | DIASTOLIC BLOOD PRESSURE: 82 MMHG | OXYGEN SATURATION: 99 % | HEART RATE: 74 BPM | WEIGHT: 247 LBS

## 2022-08-05 DIAGNOSIS — B35.6 TINEA CRURIS: Primary | ICD-10-CM

## 2022-08-05 PROCEDURE — 99213 OFFICE O/P EST LOW 20 MIN: CPT | Performed by: NURSE PRACTITIONER

## 2022-08-05 RX ORDER — FLUCONAZOLE 150 MG/1
150 TABLET ORAL WEEKLY
Qty: 4 TABLET | Refills: 0 | Status: SHIPPED | OUTPATIENT
Start: 2022-08-05 | End: 2022-08-27

## 2022-08-05 ASSESSMENT — PAIN SCALES - GENERAL: PAINLEVEL: NO PAIN (0)

## 2022-08-05 NOTE — PROGRESS NOTES
"  Assessment & Plan     Tinea cruris  Has failed topicals, proceed with PO. Sits a lot for work (sweaty) so keep area clean and dry.  - fluconazole (DIFLUCAN) 150 MG tablet; Take 1 tablet (150 mg) by mouth once a week for 4 doses      Patient Instructions   Keep area clean and dry  Followup if not better in 2 weeks      Return in about 2 weeks (around 8/19/2022), or if symptoms worsen or fail to improve.    Haritha Barros NP  [facility] LIBRA Lopez is a 27 year old, presenting for the following health issues:  Rash      History of Present Illness       Reason for visit:  Rash which has been getting larger and more irritable  Symptom onset:  1-2 weeks ago  Symptoms include:  Rash  Symptom intensity:  Moderate  Symptom progression:  Worsening  Had these symptoms before:  No    He eats 0-1 servings of fruits and vegetables daily.He consumes 2 sweetened beverage(s) daily.He exercises with enough effort to increase his heart rate 10 to 19 minutes per day.  He exercises with enough effort to increase his heart rate 3 or less days per week. He is missing 7 dose(s) of medications per week.     Has been doing topicals for 5-7 days without improvement  Very itchy  Sits a lot for work    Review of Systems   Constitutional, HEENT, cardiovascular, pulmonary, gi and gu systems are negative, except as otherwise noted.      Objective    /82   Pulse 74   Temp 97.9  F (36.6  C)   Resp 16   Ht 1.854 m (6' 1\")   Wt 112 kg (247 lb)   SpO2 99%   BMI 32.59 kg/m    Body mass index is 32.59 kg/m .  Physical Exam   GENERAL: healthy, alert and no distress  SKIN: deep red erythematous rash with well demarcated borders to b/l groin L>R                    .  ..  "

## 2022-08-09 ENCOUNTER — OFFICE VISIT (OUTPATIENT)
Dept: NEUROLOGY | Facility: CLINIC | Age: 27
End: 2022-08-09
Payer: COMMERCIAL

## 2022-08-09 DIAGNOSIS — R25.3 FASCICULATIONS: ICD-10-CM

## 2022-08-09 PROCEDURE — 95886 MUSC TEST DONE W/N TEST COMP: CPT | Performed by: PSYCHIATRY & NEUROLOGY

## 2022-08-09 PROCEDURE — 95885 MUSC TST DONE W/NERV TST LIM: CPT | Mod: 59 | Performed by: PSYCHIATRY & NEUROLOGY

## 2022-08-09 PROCEDURE — 95908 NRV CNDJ TST 3-4 STUDIES: CPT | Performed by: PSYCHIATRY & NEUROLOGY

## 2022-08-09 NOTE — LETTER
2022         RE: John Lamar  [address] [facility] [address]        Dear Colleague,    Thank you for referring your patient, John Lamar, to the [facility] NEUROLOGY CL[facility]ICS [facility]. Please see a copy of my visit note below.                        [facility]  Electrodiagnostic Laboratory                 Department of Neurology                                                                                                         Test Date:  2022    Patient: John Lamar : 1995 Physician: Maame Costa MD   Sex: Male AGE: 27 year Ref Phys: Dr. Armstrong   ID#: [id number]   Technician: Kristy Behling     Clinical Information:  John is a 27-year-old gentleman who has been experiencing fasciculations in the left hand and left lower leg recently. Highly concerned about possibility of underlying motor neuron disease.     Techniques:  Motor and sensory conduction studies were done with surface recording electrodes. EMG was done with a concentric needle electrode.     Results:  All nerve conduction studies (as indicated in the following tables) were within normal limits.      Needle evaluation did reveal some fasciculations in the left adductor digit minimi, first dorsal interosseus and left lateral gastrocnemius. Remainder of the needle examination is within normal limits.       Interpretation:    The EMG reveals few scattered fasciculations without any evidence of neurogenic injury. 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     ___________________________  Maame Costa MD        Nerve Conduction Studies  Motor Sites      Latency Amplitude Neg. Amp Diff Segment Distance Velocity Neg. Dur Neg Area Diff Temperature Comment   Site (ms) Norm (mV) Norm %  cm m/s Norm ms %  C    Left Fibular (EDB) Motor   Ankle 3.9  < 6.0 2.9  > 2.5  Ankle-EDB 8   5.7  30.4    Bel Fib Head 11.6 - 2.4 - -17.2 Bel Fib Head-Ankle 38 49  > 38 6.1 -16.0 30.1    Pop Fossa 13.1 - 2.2 -  -8.3 Pop Fossa-Bel Fib Head 9 60  > 38 5.5 -13.9 30    Left Median (APB) Motor   Wrist 3.4  < 4.4 10.6  > 5.0  Wrist-APB 8   5.9  31.3    Elbow 8.0 - 9.2 - -13.2 Elbow-Wrist 28 61  > 48 5.8 -14.4 31.3    Left Median/Ulnar (Lumb-Dors Int II) Motor        Median (Lumb I)   Wrist 3.3 - 0.82 -      10.0  31.1         Ulnar (Dorsal Int (manus))   Wrist 3.3 - 4.5 -      5.5  31.1    Left Ulnar (ADM) Motor   Wrist 3.3  < 3.5 12.3  > 5.0  Wrist-ADM 8   5.9  31.5    Bel Elbow 7.3 - 11.8 - -4.1 Bel Elbow-Wrist 25 63  > 48 6.5 2.4 31.5    Abv Elbow 9.0 - 9.8 - -16.9 Abv Elbow-Bel Elbow 10 59  > 48 6.3 -17.6 31.5      Sensory Sites      Onset Lat Peak Lat Amp (O-P) Amp (P-P) Segment Distance Velocity Temperature Comment   Site ms ms  V Norm  V  cm m/s Norm  C    Left Median Sensory   Wrist-Dig II 2.4 3.1 51  > 10 68 Wrist-Dig II 14 58  > 48 30.4    Left Median-Ulnar Palmar Sensory        Median   Palm-Wrist 1.48 2.0 29 - 38 Palm-Wrist 8 54 - 31.2         Ulnar   Palm-Wrist 1.50 1.98 12 - 19 Palm-Wrist 8 53 - 31.3    Left Sural Sensory   Calf-Lat Mall 2.5 3.2 12  > 5 14 Calf-Lat Mall 14 56  > 38 30.5    Left Ulnar Sensory   Wrist-Dig V 2.4 3.0 29  > 8 27 Wrist-Dig V 12.5 52  > 48 30.8      Inter-Nerve Comparisons     Nerve 1 Value 1 Nerve 2 Value 2 Parameter Result Normal   Sensory Sites   L Median Palm-Wrist 2.0 ms L Ulnar Palm-Wrist 2.0 ms Peak Lat Diff 0.02 ms <0.40       Electromyography     Side Muscle Ins Act Fibs/PSW Fasc HF Amp Dur Poly Recrt Int Pat   Left ADM Nml None 1+ 0 Nml Nml 0 Nml Nml   Left FDI Nml None 1+ 0 Nml Nml 0 Nml Nml   Left Pronator Teres Nml None Nml 0 Nml Nml 0 Nml Nml   Left Biceps Nml None Nml 0 Nml Nml 0 Nml Nml   Left Triceps Nml None Nml 0 Nml Nml 0 Nml Nml   Left Tib Anterior Nml None Nml 0 Nml Nml 0 Nml Nml   Left Gastroc (lateral) Nml None 1+ 0 Nml Nml 0 Nml Nml   Left Fib Longus Nml None Nml 0 Nml Nml 0 Nml Nml         NCS Waveforms:    Motor                Sensory                  Ultrasound  Images:            Again, thank you for allowing me to participate in the care of your patient.        Sincerely,        Maame Costa MD

## 2022-08-09 NOTE — Clinical Note
Don't want to raise concerns for the patient who seems very anxious but I will also point out that two of the muscles with fascics were innervated by ulnar nerve, could suggest he is leaning on elbow too much and irritating nerve. NCS otherwise normal so I wouldn't sweat it but something to keep in mind if he comes back

## 2022-08-09 NOTE — PROGRESS NOTES
[facility]  Electrodiagnostic Laboratory                 Department of Neurology                                                                                                         Test Date:  2022    Patient: John Lamar : 1995 Physician: Maame Costa MD   Sex: Male AGE: 27 year Ref Phys: Dr. Armstrong   ID#: [id number]   Technician: Kristy Behling     Clinical Information:  John is a 27-year-old gentleman who has been experiencing fasciculations in the left hand and left lower leg recently. Highly concerned about possibility of underlying motor neuron disease.     Techniques:  Motor and sensory conduction studies were done with surface recording electrodes. EMG was done with a concentric needle electrode.     Results:  All nerve conduction studies (as indicated in the following tables) were within normal limits.      Needle evaluation did reveal some fasciculations in the left adductor digit minimi, first dorsal interosseus and left lateral gastrocnemius. Remainder of the needle examination is within normal limits.       Interpretation:    The EMG reveals few scattered fasciculations without any evidence of neurogenic injury. 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     ___________________________  Maame Costa MD        Nerve Conduction Studies  Motor Sites      Latency Amplitude Neg. Amp Diff Segment Distance Velocity Neg. Dur Neg Area Diff Temperature Comment   Site (ms) Norm (mV) Norm %  cm m/s Norm ms %  C    Left Fibular (EDB) Motor   Ankle 3.9  < 6.0 2.9  > 2.5  Ankle-EDB 8   5.7  30.4    Bel Fib Head 11.6 - 2.4 - -17.2 Bel Fib Head-Ankle 38 49  > 38 6.1 -16.0 30.1    Pop Fossa 13.1 - 2.2 - -8.3 Pop Fossa-Bel Fib Head 9 60  > 38 5.5 -13.9 30    Left Median (APB) Motor   Wrist 3.4  < 4.4 10.6  > 5.0  Wrist-APB 8   5.9  31.3    Elbow 8.0 - 9.2 - -13.2 Elbow-Wrist 28 61  > 48 5.8 -14.4 31.3    Left Median/Ulnar (Lumb-Dors Int II) Motor         Median (Lumb I)   Wrist 3.3 - 0.82 -      10.0  31.1         Ulnar (Dorsal Int (manus))   Wrist 3.3 - 4.5 -      5.5  31.1    Left Ulnar (ADM) Motor   Wrist 3.3  < 3.5 12.3  > 5.0  Wrist-ADM 8   5.9  31.5    Bel Elbow 7.3 - 11.8 - -4.1 Bel Elbow-Wrist 25 63  > 48 6.5 2.4 31.5    Abv Elbow 9.0 - 9.8 - -16.9 Abv Elbow-Bel Elbow 10 59  > 48 6.3 -17.6 31.5      Sensory Sites      Onset Lat Peak Lat Amp (O-P) Amp (P-P) Segment Distance Velocity Temperature Comment   Site ms ms  V Norm  V  cm m/s Norm  C    Left Median Sensory   Wrist-Dig II 2.4 3.1 51  > 10 68 Wrist-Dig II 14 58  > 48 30.4    Left Median-Ulnar Palmar Sensory        Median   Palm-Wrist 1.48 2.0 29 - 38 Palm-Wrist 8 54 - 31.2         Ulnar   Palm-Wrist 1.50 1.98 12 - 19 Palm-Wrist 8 53 - 31.3    Left Sural Sensory   Calf-Lat Mall 2.5 3.2 12  > 5 14 Calf-Lat Mall 14 56  > 38 30.5    Left Ulnar Sensory   Wrist-Dig V 2.4 3.0 29  > 8 27 Wrist-Dig V 12.5 52  > 48 30.8      Inter-Nerve Comparisons     Nerve 1 Value 1 Nerve 2 Value 2 Parameter Result Normal   Sensory Sites   L Median Palm-Wrist 2.0 ms L Ulnar Palm-Wrist 2.0 ms Peak Lat Diff 0.02 ms <0.40       Electromyography     Side Muscle Ins Act Fibs/PSW Fasc HF Amp Dur Poly Recrt Int Pat   Left ADM Nml None 1+ 0 Nml Nml 0 Nml Nml   Left FDI Nml None 1+ 0 Nml Nml 0 Nml Nml   Left Pronator Teres Nml None Nml 0 Nml Nml 0 Nml Nml   Left Biceps Nml None Nml 0 Nml Nml 0 Nml Nml   Left Triceps Nml None Nml 0 Nml Nml 0 Nml Nml   Left Tib Anterior Nml None Nml 0 Nml Nml 0 Nml Nml   Left Gastroc (lateral) Nml None 1+ 0 Nml Nml 0 Nml Nml   Left Fib Longus Nml None Nml 0 Nml Nml 0 Nml Nml         NCS Waveforms:    Motor                Sensory                  Ultrasound Images:

## 2022-08-11 ENCOUNTER — TELEPHONE (OUTPATIENT)
Dept: NEUROLOGY | Facility: CLINIC | Age: 27
End: 2022-08-11

## 2022-08-11 NOTE — TELEPHONE ENCOUNTER
Outbound call to John [phone] called to see if he was able to come and complete f/u at 1:30 or 2:30 today he will call back shortly as he needs to check with work     Patient never called back assuming they are unable to come in today

## 2022-08-15 ENCOUNTER — OFFICE VISIT (OUTPATIENT)
Dept: NEUROLOGY | Facility: CLINIC | Age: 27
End: 2022-08-15
Attending: PEDIATRICS
Payer: COMMERCIAL

## 2022-08-15 VITALS
HEIGHT: 73 IN | WEIGHT: 246.91 LBS | DIASTOLIC BLOOD PRESSURE: 83 MMHG | HEART RATE: 76 BPM | OXYGEN SATURATION: 98 % | BODY MASS INDEX: 32.72 KG/M2 | SYSTOLIC BLOOD PRESSURE: 128 MMHG

## 2022-08-15 DIAGNOSIS — R20.2 NUMBNESS AND TINGLING IN BOTH HANDS: ICD-10-CM

## 2022-08-15 DIAGNOSIS — R25.3 TWITCHING: ICD-10-CM

## 2022-08-15 DIAGNOSIS — R20.2 PARESTHESIA OF UPPER AND LOWER EXTREMITIES OF BOTH SIDES: ICD-10-CM

## 2022-08-15 DIAGNOSIS — R25.3 FASCICULATIONS: Primary | ICD-10-CM

## 2022-08-15 DIAGNOSIS — R20.0 NUMBNESS AND TINGLING IN BOTH HANDS: ICD-10-CM

## 2022-08-15 PROCEDURE — 99212 OFFICE O/P EST SF 10 MIN: CPT | Performed by: PSYCHIATRY & NEUROLOGY

## 2022-08-15 NOTE — PROGRESS NOTES
"ESTABLISHED PATIENT NEUROLOGY NOTE    DATE OF VISIT: 8/15/2022  CLINIC LOCATION: [facility]  MRN: [id number]  PATIENT NAME: John Lamar  YOB: 1995    REASON FOR VISIT:   Chief Complaint   Patient presents with     Follow Up     Review EMG- muscle twitching      SUBJECTIVE:                                                      HISTORY OF PRESENT ILLNESS: Patient is here to follow up regarding fasciculations and paresthesias. Please refer to my initial note 7/13/2022 for further information.  Accompanied by his girlfriend.    Since the last visit, the patient reports less frequent fasciculations in his hand/left upper extremity, but persistently with his calf muscles.  No recurrence of paresthesia.  He denies interval development of new neurological complaints.  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  Tabulated data from EMG report were personally reviewed and independently interpreted.  EXAM:                                                    Physical Exam:   Vitals: /83 (BP Location: Right arm, Patient Position: Sitting, Cuff Size: Adult Large)   Pulse 76   Ht 1.854 m (6' 1\")   Wt 112 kg (246 lb 14.6 oz)   SpO2 98%   BMI 32.58 kg/m      General: pt is in NAD, cooperative.  Skin: normal turgor, moist mucous membranes, no lesions/rashes noticed.  HEENT: ATNC, white sclera, normal conjunctiva.  Respiratory: Symmetric lung excursion, no accessory respiratory muscle use.  Abdomen: Non distended.  Neurological: awake, cooperative, follows commands, no exam changes compared to the initial visit.  ASSESSMENT AND PLAN:                                                    Assessment: 27 year old male patient presents for follow-up of transient limb paresthesias and left hand fasciculations.  EMG demonstrated fasciculations in several muscles felt " benign.  We discussed results together with the patient and his girlfriend.  I do not see any evidence of ALS.  We reviewed reasons to come back.  Follow-up should be on as-needed basis.    Diagnoses:    ICD-10-CM    1. Fasciculations  R25.3    2. Paresthesia of upper and lower extremities of both sides  R20.2      Plan: At today's visit we thoroughly discussed current symptoms, evaluation results (CMG looks good), diagnosis, and the plan.    He was advised to come back if he notices any new neurological symptoms.    Next follow-up appointment is on as needed basis.    Total Time: 15 minutes spent on the date of the encounter doing chart review, history and exam, documentation and further activities per the note.    Dutch Armstrong MD  [facility]  (Chart documentation was completed in part with Dragon voice-recognition software. Even though reviewed, some grammatical, spelling, and word errors may remain.)

## 2022-08-15 NOTE — LETTER
"    8/15/2022         RE: John Lamar  [address] [facility] [address]        Dear Colleague,    Thank you for referring your patient, John Lamar, to the [facility]UROLOG[facility]. Please see a copy of my visit note below.    ESTABLISHED PATIENT NEUROLOGY NOTE    DATE OF VISIT: 8/15/2022  CLINIC LOCATION: [facility]  MRN: [id number]  PATIENT NAME: John Lamar  YOB: 1995    REASON FOR VISIT:   Chief Complaint   Patient presents with     Follow Up     Review EMG- muscle twitching      SUBJECTIVE:                                                      HISTORY OF PRESENT ILLNESS: Patient is here to follow up regarding fasciculations and paresthesias. Please refer to my initial note 7/13/2022 for further information.  Accompanied by his girlfriend.    Since the last visit, the patient reports less frequent fasciculations in his hand/left upper extremity, but persistently with his calf muscles.  No recurrence of paresthesia.  He denies interval development of new neurological complaints.  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  Tabulated data from EMG report were personally reviewed and independently interpreted.  EXAM:                                                    Physical Exam:   Vitals: /83 (BP Location: Right arm, Patient Position: Sitting, Cuff Size: Adult Large)   Pulse 76   Ht 1.854 m (6' 1\")   Wt 112 kg (246 lb 14.6 oz)   SpO2 98%   BMI 32.58 kg/m      General: pt is in NAD, cooperative.  Skin: normal turgor, moist mucous membranes, no lesions/rashes noticed.  HEENT: ATNC, white sclera, normal conjunctiva.  Respiratory: Symmetric lung excursion, no accessory respiratory muscle use.  Abdomen: Non distended.  Neurological: awake, cooperative, follows commands, no exam changes compared to the initial " visit.  ASSESSMENT AND PLAN:                                                    Assessment: 27 year old male patient presents for follow-up of transient limb paresthesias and left hand fasciculations.  EMG demonstrated fasciculations in several muscles felt benign.  We discussed results together with the patient and his girlfriend.  I do not see any evidence of ALS.  We reviewed reasons to come back.  Follow-up should be on as-needed basis.    Diagnoses:    ICD-10-CM    1. Fasciculations  R25.3    2. Paresthesia of upper and lower extremities of both sides  R20.2      Plan: At today's visit we thoroughly discussed current symptoms, evaluation results (CMG looks good), diagnosis, and the plan.    He was advised to come back if he notices any new neurological symptoms.    Next follow-up appointment is on as needed basis.    Total Time: 15 minutes spent on the date of the encounter doing chart review, history and exam, documentation and further activities per the note.    Dutch Armstrong MD  [facility] Neurology  (Chart documentation was completed in part with Dragon voice-recognition software. Even though reviewed, some grammatical, spelling, and word errors may remain.)        Again, thank you for allowing me to participate in the care of your patient.        Sincerely,        Dutch Armstrong MD

## 2022-08-15 NOTE — PATIENT INSTRUCTIONS
AFTER VISIT SUMMARY (AVS):    At today's visit we thoroughly discussed current symptoms, evaluation results (EMG looks good), diagnosis, and the plan.    Please come back if you notice any new neurological symptoms.    Next follow-up appointment is on as needed basis.    Please do not hesitate to call me with any questions or concerns.    Thanks.

## 2022-09-12 ENCOUNTER — MYC REFILL (OUTPATIENT)
Dept: PEDIATRICS | Facility: CLINIC | Age: 27
End: 2022-09-12

## 2022-09-12 DIAGNOSIS — F90.9 ATTENTION DEFICIT HYPERACTIVITY DISORDER (ADHD), UNSPECIFIED ADHD TYPE: ICD-10-CM

## 2022-09-12 RX ORDER — METHYLPHENIDATE HYDROCHLORIDE 36 MG/1
36 TABLET ORAL DAILY
Qty: 30 TABLET | Refills: 0 | Status: CANCELLED | OUTPATIENT
Start: 2022-09-12

## 2022-09-12 NOTE — TELEPHONE ENCOUNTER
Routing refill request to provider for review/approval because:  Drug not on the [facility] refill protocol     Amanda Sanchez RN

## 2022-09-13 RX ORDER — METHYLPHENIDATE HYDROCHLORIDE 36 MG/1
36 TABLET ORAL DAILY
Qty: 30 TABLET | Refills: 0 | Status: SHIPPED | OUTPATIENT
Start: 2022-11-14 | End: 2022-12-14

## 2022-09-13 RX ORDER — METHYLPHENIDATE HYDROCHLORIDE 36 MG/1
36 TABLET ORAL DAILY
Qty: 30 TABLET | Refills: 0 | Status: SHIPPED | OUTPATIENT
Start: 2022-10-14 | End: 2022-11-13

## 2022-09-13 RX ORDER — METHYLPHENIDATE HYDROCHLORIDE 36 MG/1
36 TABLET ORAL DAILY
Qty: 30 TABLET | Refills: 0 | Status: SHIPPED | OUTPATIENT
Start: 2022-09-13 | End: 2022-10-13

## 2022-11-20 ENCOUNTER — HEALTH MAINTENANCE LETTER (OUTPATIENT)
Age: 27
End: 2022-11-20

## 2022-11-23 ENCOUNTER — MYC REFILL (OUTPATIENT)
Dept: PEDIATRICS | Facility: CLINIC | Age: 27
End: 2022-11-23

## 2022-11-23 DIAGNOSIS — F90.9 ATTENTION DEFICIT HYPERACTIVITY DISORDER (ADHD), UNSPECIFIED ADHD TYPE: ICD-10-CM

## 2022-11-23 RX ORDER — METHYLPHENIDATE HYDROCHLORIDE 36 MG/1
36 TABLET ORAL DAILY
Qty: 30 TABLET | Refills: 0 | Status: SHIPPED | OUTPATIENT
Start: 2022-11-23 | End: 2022-11-29

## 2022-11-23 RX ORDER — METHYLPHENIDATE HYDROCHLORIDE 36 MG/1
36 TABLET ORAL DAILY
Qty: 30 TABLET | Refills: 0 | Status: SHIPPED | OUTPATIENT
Start: 2023-01-24 | End: 2023-01-06

## 2022-11-23 RX ORDER — METHYLPHENIDATE HYDROCHLORIDE 36 MG/1
36 TABLET ORAL DAILY
Qty: 30 TABLET | Refills: 0 | Status: CANCELLED | OUTPATIENT
Start: 2022-11-23

## 2022-11-23 RX ORDER — METHYLPHENIDATE HYDROCHLORIDE 36 MG/1
36 TABLET ORAL DAILY
Qty: 30 TABLET | Refills: 0 | Status: SHIPPED | OUTPATIENT
Start: 2022-12-24 | End: 2023-01-06

## 2022-11-23 NOTE — TELEPHONE ENCOUNTER
Routing refill request to provider for review/approval because:  Drug not on the [facility] refill protocol     Alek PATE RN 11/23/2022 at 11:46 AM

## 2022-11-28 ENCOUNTER — MYC MEDICAL ADVICE (OUTPATIENT)
Dept: PEDIATRICS | Facility: CLINIC | Age: 27
End: 2022-11-28

## 2022-11-28 DIAGNOSIS — F90.9 ATTENTION DEFICIT HYPERACTIVITY DISORDER (ADHD), UNSPECIFIED ADHD TYPE: ICD-10-CM

## 2022-11-28 NOTE — TELEPHONE ENCOUNTER
Please see patient my chart message request to change phamracies due to shortage.     RAYO Rivera on 11/28/2022 at 5:21 PM

## 2022-11-29 RX ORDER — METHYLPHENIDATE HYDROCHLORIDE 36 MG/1
36 TABLET ORAL DAILY
Qty: 30 TABLET | Refills: 0 | Status: SHIPPED | OUTPATIENT
Start: 2022-11-29 | End: 2023-01-06

## 2023-01-06 ENCOUNTER — MYC REFILL (OUTPATIENT)
Dept: PEDIATRICS | Facility: CLINIC | Age: 28
End: 2023-01-06

## 2023-01-06 DIAGNOSIS — F90.9 ATTENTION DEFICIT HYPERACTIVITY DISORDER (ADHD), UNSPECIFIED ADHD TYPE: ICD-10-CM

## 2023-01-06 RX ORDER — METHYLPHENIDATE HYDROCHLORIDE 36 MG/1
36 TABLET ORAL DAILY
Qty: 30 TABLET | Refills: 0 | Status: SHIPPED | OUTPATIENT
Start: 2023-02-05 | End: 2023-07-18

## 2023-01-06 RX ORDER — METHYLPHENIDATE HYDROCHLORIDE 36 MG/1
36 TABLET ORAL DAILY
Qty: 30 TABLET | Refills: 0 | Status: SHIPPED | OUTPATIENT
Start: 2023-03-07 | End: 2023-01-09

## 2023-01-06 RX ORDER — METHYLPHENIDATE HYDROCHLORIDE 36 MG/1
36 TABLET ORAL DAILY
Qty: 30 TABLET | Refills: 0 | Status: SHIPPED | OUTPATIENT
Start: 2023-01-24 | End: 2023-07-18

## 2023-01-06 RX ORDER — METHYLPHENIDATE HYDROCHLORIDE 36 MG/1
36 TABLET ORAL DAILY
Qty: 30 TABLET | Refills: 0 | Status: CANCELLED | OUTPATIENT
Start: 2023-01-06

## 2023-01-08 ENCOUNTER — MYC MEDICAL ADVICE (OUTPATIENT)
Dept: PEDIATRICS | Facility: CLINIC | Age: 28
End: 2023-01-08

## 2023-01-08 DIAGNOSIS — F90.9 ATTENTION DEFICIT HYPERACTIVITY DISORDER (ADHD), UNSPECIFIED ADHD TYPE: ICD-10-CM

## 2023-01-08 RX ORDER — METHYLPHENIDATE HYDROCHLORIDE 36 MG/1
36 TABLET ORAL DAILY
Qty: 30 TABLET | Refills: 0 | Status: CANCELLED | OUTPATIENT
Start: 2023-01-24

## 2023-01-09 RX ORDER — METHYLPHENIDATE HYDROCHLORIDE 36 MG/1
36 TABLET ORAL DAILY
Qty: 15 TABLET | Refills: 0 | Status: SHIPPED | OUTPATIENT
Start: 2023-01-09 | End: 2023-07-18

## 2023-01-09 NOTE — TELEPHONE ENCOUNTER
15 day supply signed. Should last until 1/24, when next prescription was signed.    Ban Lugo MD  Internal Medicine & Pediatrics  [facility] [place]  She/her

## 2023-01-09 NOTE — TELEPHONE ENCOUNTER
See earlier prescription from today.    Ban Lugo MD  Internal Medicine & Pediatrics  [facility] [place]  She/her

## 2023-02-04 ENCOUNTER — MYC REFILL (OUTPATIENT)
Dept: PEDIATRICS | Facility: CLINIC | Age: 28
End: 2023-02-04

## 2023-02-04 DIAGNOSIS — F90.9 ATTENTION DEFICIT HYPERACTIVITY DISORDER (ADHD), UNSPECIFIED ADHD TYPE: ICD-10-CM

## 2023-02-04 RX ORDER — METHYLPHENIDATE HYDROCHLORIDE 36 MG/1
36 TABLET ORAL DAILY
Qty: 30 TABLET | Refills: 0 | Status: CANCELLED | OUTPATIENT
Start: 2023-02-04

## 2023-02-06 RX ORDER — METHYLPHENIDATE HYDROCHLORIDE 36 MG/1
36 TABLET ORAL DAILY
Qty: 30 TABLET | Refills: 0 | Status: SHIPPED | OUTPATIENT
Start: 2023-03-09 | End: 2023-04-08

## 2023-02-06 RX ORDER — METHYLPHENIDATE HYDROCHLORIDE 36 MG/1
36 TABLET ORAL DAILY
Qty: 30 TABLET | Refills: 0 | Status: SHIPPED | OUTPATIENT
Start: 2023-02-06 | End: 2023-03-08

## 2023-02-06 RX ORDER — METHYLPHENIDATE HYDROCHLORIDE 36 MG/1
36 TABLET ORAL DAILY
Qty: 30 TABLET | Refills: 0 | Status: SHIPPED | OUTPATIENT
Start: 2023-04-09 | End: 2023-05-09

## 2023-04-15 ENCOUNTER — HEALTH MAINTENANCE LETTER (OUTPATIENT)
Age: 28
End: 2023-04-15

## 2023-05-11 ENCOUNTER — MYC REFILL (OUTPATIENT)
Dept: PEDIATRICS | Facility: CLINIC | Age: 28
End: 2023-05-11

## 2023-05-11 DIAGNOSIS — F90.9 ATTENTION DEFICIT HYPERACTIVITY DISORDER (ADHD), UNSPECIFIED ADHD TYPE: ICD-10-CM

## 2023-05-11 RX ORDER — METHYLPHENIDATE HYDROCHLORIDE 36 MG/1
36 TABLET ORAL DAILY
Qty: 30 TABLET | Refills: 0 | Status: SHIPPED | OUTPATIENT
Start: 2023-07-12 | End: 2023-07-18

## 2023-05-11 RX ORDER — METHYLPHENIDATE HYDROCHLORIDE 36 MG/1
36 TABLET ORAL DAILY
Qty: 30 TABLET | Refills: 0 | Status: SHIPPED | OUTPATIENT
Start: 2023-06-11 | End: 2023-07-11

## 2023-05-11 RX ORDER — METHYLPHENIDATE HYDROCHLORIDE 36 MG/1
36 TABLET ORAL DAILY
Qty: 30 TABLET | Refills: 0 | Status: CANCELLED | OUTPATIENT
Start: 2023-05-11

## 2023-05-11 RX ORDER — METHYLPHENIDATE HYDROCHLORIDE 36 MG/1
36 TABLET ORAL DAILY
Qty: 30 TABLET | Refills: 0 | Status: SHIPPED | OUTPATIENT
Start: 2023-05-11 | End: 2023-06-10

## 2023-06-13 ENCOUNTER — MYC REFILL (OUTPATIENT)
Dept: PEDIATRICS | Facility: CLINIC | Age: 28
End: 2023-06-13

## 2023-06-13 DIAGNOSIS — F90.9 ATTENTION DEFICIT HYPERACTIVITY DISORDER (ADHD), UNSPECIFIED ADHD TYPE: ICD-10-CM

## 2023-06-13 RX ORDER — METHYLPHENIDATE HYDROCHLORIDE 36 MG/1
36 TABLET ORAL DAILY
Qty: 30 TABLET | Refills: 0 | Status: SHIPPED | OUTPATIENT
Start: 2023-07-14 | End: 2023-07-18

## 2023-06-13 RX ORDER — METHYLPHENIDATE HYDROCHLORIDE 36 MG/1
36 TABLET ORAL DAILY
Qty: 30 TABLET | Refills: 0 | Status: SHIPPED | OUTPATIENT
Start: 2023-06-13 | End: 2023-07-13

## 2023-06-13 RX ORDER — METHYLPHENIDATE HYDROCHLORIDE 36 MG/1
36 TABLET ORAL DAILY
Qty: 30 TABLET | Refills: 0 | Status: SHIPPED | OUTPATIENT
Start: 2023-08-14 | End: 2023-07-18

## 2023-06-13 RX ORDER — METHYLPHENIDATE HYDROCHLORIDE 36 MG/1
36 TABLET ORAL DAILY
Qty: 30 TABLET | Refills: 0 | Status: CANCELLED | OUTPATIENT
Start: 2023-06-13

## 2023-07-11 ENCOUNTER — MYC REFILL (OUTPATIENT)
Dept: PEDIATRICS | Facility: CLINIC | Age: 28
End: 2023-07-11
Payer: COMMERCIAL

## 2023-07-11 DIAGNOSIS — F90.9 ATTENTION DEFICIT HYPERACTIVITY DISORDER (ADHD), UNSPECIFIED ADHD TYPE: ICD-10-CM

## 2023-07-11 RX ORDER — METHYLPHENIDATE HYDROCHLORIDE 36 MG/1
36 TABLET ORAL DAILY
Qty: 30 TABLET | Refills: 0 | OUTPATIENT
Start: 2023-07-11

## 2023-07-11 NOTE — TELEPHONE ENCOUNTER
Dr. Lugo sent three month supply.   Also needs to be seen yearly. Due for appointment with Dr Lugo or myself for ADHD follow up.   Michelle Fermin PA-C

## 2023-07-18 ENCOUNTER — OFFICE VISIT (OUTPATIENT)
Dept: PEDIATRICS | Facility: CLINIC | Age: 28
End: 2023-07-18
Payer: COMMERCIAL

## 2023-07-18 VITALS
DIASTOLIC BLOOD PRESSURE: 70 MMHG | HEIGHT: 73 IN | BODY MASS INDEX: 32.18 KG/M2 | RESPIRATION RATE: 18 BRPM | TEMPERATURE: 97.3 F | WEIGHT: 242.8 LBS | HEART RATE: 72 BPM | SYSTOLIC BLOOD PRESSURE: 110 MMHG | OXYGEN SATURATION: 97 %

## 2023-07-18 DIAGNOSIS — F90.9 ATTENTION DEFICIT HYPERACTIVITY DISORDER (ADHD), UNSPECIFIED ADHD TYPE: Primary | ICD-10-CM

## 2023-07-18 PROCEDURE — 99213 OFFICE O/P EST LOW 20 MIN: CPT | Performed by: STUDENT IN AN ORGANIZED HEALTH CARE EDUCATION/TRAINING PROGRAM

## 2023-07-18 RX ORDER — METHYLPHENIDATE HYDROCHLORIDE 36 MG/1
36 TABLET ORAL DAILY
Qty: 30 TABLET | Refills: 0 | Status: SHIPPED | OUTPATIENT
Start: 2023-07-18 | End: 2023-08-17

## 2023-07-18 RX ORDER — METHYLPHENIDATE HYDROCHLORIDE 36 MG/1
36 TABLET ORAL DAILY
Qty: 30 TABLET | Refills: 0 | Status: SHIPPED | OUTPATIENT
Start: 2023-09-18 | End: 2023-10-18

## 2023-07-18 RX ORDER — METHYLPHENIDATE HYDROCHLORIDE 36 MG/1
36 TABLET ORAL DAILY
Qty: 30 TABLET | Refills: 0 | Status: SHIPPED | OUTPATIENT
Start: 2023-08-18 | End: 2023-09-17

## 2023-07-18 RX ORDER — MULTIVITAMIN,THERAPEUTIC
1 TABLET ORAL DAILY
COMMUNITY
End: 2023-08-12

## 2023-07-18 ASSESSMENT — PAIN SCALES - GENERAL: PAINLEVEL: NO PAIN (0)

## 2023-07-18 NOTE — PROGRESS NOTES
"  Assessment & Plan     Attention deficit hyperactivity disorder (ADHD), unspecified ADHD type  Continue current medication concerta 36mg. Recommend annual visit.        Ban Lugo MD  [facility] LIBRA Lopez is a 28 year old, presenting for the following health issues:  Recheck Medication (ADHD)        7/18/2023     9:33 AM   Additional Questions   Roomed by Ingris   Accompanied by none         7/18/2023     9:33 AM   Patient Reported Additional Medications   Patient reports taking the following new medications none     History of Present Illness       Reason for visit:  Checkup    He eats 0-1 servings of fruits and vegetables daily.He consumes 0 sweetened beverage(s) daily.He exercises with enough effort to increase his heart rate 10 to 19 minutes per day.  He exercises with enough effort to increase his heart rate 3 or less days per week.   He is taking medications regularly.       Medication Followup of Concerta    Taking Medication as prescribed: yes    Side Effects:  None    Medication Helping Symptoms:  yes    -had issues with   - and works until 2-3am  -has been considering taking it later in the day due to later work scheudle  -symptoms helped: \"making good decisions\" and impulsivity  -takes medication daily  -got pill organizer and takes with travel to work  -started multivitamin which helps        Objective    /70 (BP Location: Right arm, Patient Position: Sitting, Cuff Size: Adult Large)   Pulse 72   Temp 97.3  F (36.3  C) (Tympanic)   Resp 18   Ht 1.848 m (6' 0.75\")   Wt 110.1 kg (242 lb 12.8 oz)   SpO2 97%   BMI 32.25 kg/m    Body mass index is 32.25 kg/m .  Physical Exam   GEN: Alert and appropriately interactive for age  EYES: Eyes grossly normal to inspection, conjunctivae and sclerae normal  RESP: Breathing comfortably on room air   CV: Warm and well perfused peripheral extremities ; RRR no murmur  MS: no gross musculoskeletal " defects noted, no edema  NEURO: Alert and oriented x 4. Gait normal. Speech fluent.    PSYCH: Affect normal/bright. Appearance well groomed.

## 2023-07-18 NOTE — PATIENT INSTRUCTIONS
Focusing on continuing a good exercise routine with 150 minutes of moderate exercise weekly and a diet including 5 servings of fruits and vegetables daily will help protect against the risk of heart attack or stroke in the future.      Come back annually for medication refills.      You are up to date on preventive health measures    You could get a COVID19 booster in the fall

## 2023-08-12 ENCOUNTER — MYC REFILL (OUTPATIENT)
Dept: PEDIATRICS | Facility: CLINIC | Age: 28
End: 2023-08-12
Payer: COMMERCIAL

## 2023-08-12 DIAGNOSIS — R20.2 TINGLING OF BOTH FEET: Primary | ICD-10-CM

## 2023-08-15 RX ORDER — MULTIVITAMIN,THERAPEUTIC
1 TABLET ORAL DAILY
Qty: 90 TABLET | Refills: 4 | Status: SHIPPED | OUTPATIENT
Start: 2023-08-15 | End: 2023-12-27

## 2023-08-15 NOTE — TELEPHONE ENCOUNTER
Routing refill request to provider for review/approval because:  Medication is reported/historical    Cherie Ro RN on 8/15/2023 at 4:15 PM

## 2023-08-18 ENCOUNTER — MYC REFILL (OUTPATIENT)
Dept: PEDIATRICS | Facility: CLINIC | Age: 28
End: 2023-08-18
Payer: COMMERCIAL

## 2023-08-18 DIAGNOSIS — F90.9 ATTENTION DEFICIT HYPERACTIVITY DISORDER (ADHD), UNSPECIFIED ADHD TYPE: ICD-10-CM

## 2023-08-18 RX ORDER — METHYLPHENIDATE HYDROCHLORIDE 36 MG/1
36 TABLET ORAL DAILY
Qty: 30 TABLET | Refills: 0 | Status: CANCELLED | OUTPATIENT
Start: 2023-08-18

## 2023-09-11 ENCOUNTER — MYC REFILL (OUTPATIENT)
Dept: PEDIATRICS | Facility: CLINIC | Age: 28
End: 2023-09-11
Payer: COMMERCIAL

## 2023-09-11 DIAGNOSIS — F90.9 ATTENTION DEFICIT HYPERACTIVITY DISORDER (ADHD), UNSPECIFIED ADHD TYPE: ICD-10-CM

## 2023-09-11 RX ORDER — METHYLPHENIDATE HYDROCHLORIDE 36 MG/1
36 TABLET ORAL DAILY
Qty: 30 TABLET | Refills: 0 | Status: CANCELLED | OUTPATIENT
Start: 2023-09-11

## 2023-10-24 ENCOUNTER — MYC REFILL (OUTPATIENT)
Dept: PEDIATRICS | Facility: CLINIC | Age: 28
End: 2023-10-24
Payer: COMMERCIAL

## 2023-10-24 DIAGNOSIS — F90.9 ATTENTION DEFICIT HYPERACTIVITY DISORDER (ADHD), UNSPECIFIED ADHD TYPE: ICD-10-CM

## 2023-10-24 RX ORDER — METHYLPHENIDATE HYDROCHLORIDE 36 MG/1
36 TABLET ORAL DAILY
Qty: 30 TABLET | Refills: 0 | Status: CANCELLED | OUTPATIENT
Start: 2023-10-24

## 2023-10-24 RX ORDER — METHYLPHENIDATE HYDROCHLORIDE 36 MG/1
36 TABLET ORAL DAILY
Qty: 30 TABLET | Refills: 0 | Status: SHIPPED | OUTPATIENT
Start: 2023-11-24 | End: 2023-12-24

## 2023-10-24 RX ORDER — METHYLPHENIDATE HYDROCHLORIDE 36 MG/1
36 TABLET ORAL DAILY
Qty: 30 TABLET | Refills: 0 | Status: SHIPPED | OUTPATIENT
Start: 2023-12-25 | End: 2024-01-24

## 2023-10-24 RX ORDER — METHYLPHENIDATE HYDROCHLORIDE 36 MG/1
36 TABLET ORAL DAILY
Qty: 30 TABLET | Refills: 0 | Status: SHIPPED | OUTPATIENT
Start: 2023-10-24 | End: 2023-11-23

## 2023-11-23 ENCOUNTER — MYC REFILL (OUTPATIENT)
Dept: PEDIATRICS | Facility: CLINIC | Age: 28
End: 2023-11-23
Payer: COMMERCIAL

## 2023-11-23 DIAGNOSIS — F90.9 ATTENTION DEFICIT HYPERACTIVITY DISORDER (ADHD), UNSPECIFIED ADHD TYPE: ICD-10-CM

## 2023-11-23 RX ORDER — METHYLPHENIDATE HYDROCHLORIDE 36 MG/1
36 TABLET ORAL DAILY
Qty: 30 TABLET | Refills: 0 | Status: CANCELLED | OUTPATIENT
Start: 2023-11-23

## 2023-12-27 ENCOUNTER — MYC REFILL (OUTPATIENT)
Dept: PEDIATRICS | Facility: CLINIC | Age: 28
End: 2023-12-27
Payer: COMMERCIAL

## 2023-12-27 DIAGNOSIS — F90.9 ATTENTION DEFICIT HYPERACTIVITY DISORDER (ADHD), UNSPECIFIED ADHD TYPE: ICD-10-CM

## 2023-12-27 DIAGNOSIS — R20.2 TINGLING OF BOTH FEET: ICD-10-CM

## 2023-12-27 RX ORDER — METHYLPHENIDATE HYDROCHLORIDE 36 MG/1
36 TABLET ORAL DAILY
Qty: 30 TABLET | Refills: 0 | Status: CANCELLED | OUTPATIENT
Start: 2023-12-27

## 2023-12-28 RX ORDER — METHYLPHENIDATE HYDROCHLORIDE 36 MG/1
36 TABLET ORAL DAILY
Qty: 30 TABLET | Refills: 0 | Status: SHIPPED | OUTPATIENT
Start: 2023-12-28 | End: 2024-01-27

## 2023-12-28 RX ORDER — MULTIVITAMIN,THERAPEUTIC
1 TABLET ORAL DAILY
Qty: 90 TABLET | Refills: 4 | Status: SHIPPED | OUTPATIENT
Start: 2023-12-28

## 2023-12-28 RX ORDER — METHYLPHENIDATE HYDROCHLORIDE 36 MG/1
36 TABLET ORAL DAILY
Qty: 30 TABLET | Refills: 0 | Status: SHIPPED | OUTPATIENT
Start: 2024-01-28 | End: 2024-02-27

## 2023-12-28 RX ORDER — METHYLPHENIDATE HYDROCHLORIDE 36 MG/1
36 TABLET ORAL DAILY
Qty: 30 TABLET | Refills: 0 | Status: SHIPPED | OUTPATIENT
Start: 2024-02-28 | End: 2024-03-29

## 2023-12-28 NOTE — TELEPHONE ENCOUNTER
Recommend office visit for future refills.  3 month supply sent.    Ban Lugo MD  Internal Medicine & Pediatrics  [facility] [place]  She/her

## 2024-03-30 ENCOUNTER — MYC REFILL (OUTPATIENT)
Dept: PEDIATRICS | Facility: CLINIC | Age: 29
End: 2024-03-30
Payer: COMMERCIAL

## 2024-03-30 DIAGNOSIS — F90.9 ATTENTION DEFICIT HYPERACTIVITY DISORDER (ADHD), UNSPECIFIED ADHD TYPE: ICD-10-CM

## 2024-03-30 DIAGNOSIS — R20.2 TINGLING OF BOTH FEET: ICD-10-CM

## 2024-04-01 RX ORDER — METHYLPHENIDATE HYDROCHLORIDE 36 MG/1
36 TABLET ORAL DAILY
Qty: 30 TABLET | Refills: 0 | Status: CANCELLED | OUTPATIENT
Start: 2024-04-01

## 2024-04-01 RX ORDER — MULTIVITAMIN,THERAPEUTIC
1 TABLET ORAL DAILY
Qty: 90 TABLET | Refills: 4 | OUTPATIENT
Start: 2024-04-01

## 2024-04-01 RX ORDER — METHYLPHENIDATE HYDROCHLORIDE 36 MG/1
36 TABLET ORAL DAILY
Qty: 30 TABLET | Refills: 0 | Status: SHIPPED | OUTPATIENT
Start: 2024-05-02 | End: 2024-06-01

## 2024-04-01 RX ORDER — METHYLPHENIDATE HYDROCHLORIDE 36 MG/1
36 TABLET ORAL DAILY
Qty: 30 TABLET | Refills: 0 | Status: SHIPPED | OUTPATIENT
Start: 2024-04-01 | End: 2024-05-01

## 2024-04-01 RX ORDER — METHYLPHENIDATE HYDROCHLORIDE 36 MG/1
36 TABLET ORAL DAILY
Qty: 30 TABLET | Refills: 0 | Status: SHIPPED | OUTPATIENT
Start: 2024-06-02 | End: 2024-07-02

## 2024-06-16 ENCOUNTER — HEALTH MAINTENANCE LETTER (OUTPATIENT)
Age: 29
End: 2024-06-16

## 2024-07-18 ENCOUNTER — OFFICE VISIT (OUTPATIENT)
Dept: INTERNAL MEDICINE | Facility: CLINIC | Age: 29
End: 2024-07-18
Payer: COMMERCIAL

## 2024-07-18 ENCOUNTER — ANCILLARY PROCEDURE (OUTPATIENT)
Dept: GENERAL RADIOLOGY | Facility: CLINIC | Age: 29
End: 2024-07-18
Attending: INTERNAL MEDICINE
Payer: COMMERCIAL

## 2024-07-18 VITALS
WEIGHT: 236.4 LBS | HEIGHT: 73 IN | SYSTOLIC BLOOD PRESSURE: 126 MMHG | HEART RATE: 95 BPM | DIASTOLIC BLOOD PRESSURE: 80 MMHG | BODY MASS INDEX: 31.33 KG/M2 | RESPIRATION RATE: 15 BRPM | OXYGEN SATURATION: 99 % | TEMPERATURE: 98 F

## 2024-07-18 DIAGNOSIS — M54.41 ACUTE RIGHT-SIDED LOW BACK PAIN WITH RIGHT-SIDED SCIATICA: ICD-10-CM

## 2024-07-18 DIAGNOSIS — Z00.00 ROUTINE GENERAL MEDICAL EXAMINATION AT A HEALTH CARE FACILITY: Primary | ICD-10-CM

## 2024-07-18 DIAGNOSIS — Z13.220 ENCOUNTER FOR SCREENING FOR LIPID DISORDER: ICD-10-CM

## 2024-07-18 DIAGNOSIS — K62.5 BRBPR (BRIGHT RED BLOOD PER RECTUM): ICD-10-CM

## 2024-07-18 LAB
ANION GAP SERPL CALCULATED.3IONS-SCNC: 5 MMOL/L (ref 7–15)
BASOPHILS # BLD AUTO: 0 10E3/UL (ref 0–0.2)
BASOPHILS NFR BLD AUTO: 0 %
BUN SERPL-MCNC: 16.3 MG/DL (ref 6–20)
CALCIUM SERPL-MCNC: 10 MG/DL (ref 8.8–10.4)
CHLORIDE SERPL-SCNC: 104 MMOL/L (ref 98–107)
CHOLEST SERPL-MCNC: 234 MG/DL
CREAT SERPL-MCNC: 1.16 MG/DL (ref 0.67–1.17)
EGFRCR SERPLBLD CKD-EPI 2021: 87 ML/MIN/1.73M2
EOSINOPHIL # BLD AUTO: 0 10E3/UL (ref 0–0.7)
EOSINOPHIL NFR BLD AUTO: 1 %
ERYTHROCYTE [DISTWIDTH] IN BLOOD BY AUTOMATED COUNT: 12.3 % (ref 10–15)
FASTING STATUS PATIENT QL REPORTED: NO
FASTING STATUS PATIENT QL REPORTED: NO
GLUCOSE SERPL-MCNC: 91 MG/DL (ref 70–99)
HCO3 SERPL-SCNC: 31 MMOL/L (ref 22–29)
HCT VFR BLD AUTO: 49.4 % (ref 40–53)
HDLC SERPL-MCNC: 48 MG/DL
HGB BLD-MCNC: 17.1 G/DL (ref 13.3–17.7)
IMM GRANULOCYTES # BLD: 0 10E3/UL
IMM GRANULOCYTES NFR BLD: 0 %
LDLC SERPL CALC-MCNC: 142 MG/DL
LYMPHOCYTES # BLD AUTO: 1.8 10E3/UL (ref 0.8–5.3)
LYMPHOCYTES NFR BLD AUTO: 33 %
MCH RBC QN AUTO: 29.3 PG (ref 26.5–33)
MCHC RBC AUTO-ENTMCNC: 34.6 G/DL (ref 31.5–36.5)
MCV RBC AUTO: 85 FL (ref 78–100)
MONOCYTES # BLD AUTO: 0.3 10E3/UL (ref 0–1.3)
MONOCYTES NFR BLD AUTO: 6 %
NEUTROPHILS # BLD AUTO: 3.2 10E3/UL (ref 1.6–8.3)
NEUTROPHILS NFR BLD AUTO: 59 %
NONHDLC SERPL-MCNC: 186 MG/DL
PLATELET # BLD AUTO: 226 10E3/UL (ref 150–450)
POTASSIUM SERPL-SCNC: 5 MMOL/L (ref 3.4–5.3)
RBC # BLD AUTO: 5.83 10E6/UL (ref 4.4–5.9)
SODIUM SERPL-SCNC: 140 MMOL/L (ref 135–145)
TRIGL SERPL-MCNC: 222 MG/DL
WBC # BLD AUTO: 5.3 10E3/UL (ref 4–11)

## 2024-07-18 PROCEDURE — 36415 COLL VENOUS BLD VENIPUNCTURE: CPT | Performed by: INTERNAL MEDICINE

## 2024-07-18 PROCEDURE — 72100 X-RAY EXAM L-S SPINE 2/3 VWS: CPT | Mod: TC | Performed by: RADIOLOGY

## 2024-07-18 PROCEDURE — 99213 OFFICE O/P EST LOW 20 MIN: CPT | Mod: 25 | Performed by: INTERNAL MEDICINE

## 2024-07-18 PROCEDURE — 80048 BASIC METABOLIC PNL TOTAL CA: CPT | Performed by: INTERNAL MEDICINE

## 2024-07-18 PROCEDURE — 99395 PREV VISIT EST AGE 18-39: CPT | Performed by: INTERNAL MEDICINE

## 2024-07-18 PROCEDURE — 80061 LIPID PANEL: CPT | Performed by: INTERNAL MEDICINE

## 2024-07-18 PROCEDURE — 85025 COMPLETE CBC W/AUTO DIFF WBC: CPT | Performed by: INTERNAL MEDICINE

## 2024-07-18 RX ORDER — METHYLPHENIDATE HYDROCHLORIDE 36 MG/1
36 TABLET ORAL EVERY MORNING
COMMUNITY
End: 2024-07-22

## 2024-07-18 SDOH — HEALTH STABILITY: PHYSICAL HEALTH: ON AVERAGE, HOW MANY MINUTES DO YOU ENGAGE IN EXERCISE AT THIS LEVEL?: 0 MIN

## 2024-07-18 SDOH — HEALTH STABILITY: PHYSICAL HEALTH: ON AVERAGE, HOW MANY DAYS PER WEEK DO YOU ENGAGE IN MODERATE TO STRENUOUS EXERCISE (LIKE A BRISK WALK)?: 0 DAYS

## 2024-07-18 ASSESSMENT — SOCIAL DETERMINANTS OF HEALTH (SDOH): HOW OFTEN DO YOU GET TOGETHER WITH FRIENDS OR RELATIVES?: ONCE A WEEK

## 2024-07-18 ASSESSMENT — ENCOUNTER SYMPTOMS: BACK PAIN: 1

## 2024-07-18 NOTE — PROGRESS NOTES
{PROVIDER CHARTING PREFERENCE:541452}    Cristin Lopez is a 29 year old, presenting for the following health issues:  Back Pain    Back Pain     History of Present Illness       Back Pain:  He presents for follow up of back pain. Patient's back pain is a new problem.    Original cause of back pain: not sure  First noticed back pain: more than 1 month ago  Patient feels back pain: constantlyLocation of back pain:  Right lower back, right hip and right side of waist  Description of back pain: sharp  Back pain spreads: right thigh and right knee    Since patient first noticed back pain, pain is: gradually worsening  Does back pain interfere with his job:  Not applicable  On a scale of 1-10 (10 being the worst), patient describes pain as:  5  What makes back pain worse: bending, sitting and twisting   Acupuncture: not tried  Acetaminophen: not helpful  Activity or exercise: not helpful  Chiropractor:  Not tried  Cold: not tried  Heat: not helpful  Massage: not tried  Muscle relaxants: not tried  NSAIDS: not helpful  Opioids: not tried  Physical Therapy: not tried  Rest: not helpful  Steroid Injection: not tried  Stretching: not helpful  Surgery: not tried  TENS unit: not tried  Topical pain relievers: not tried  Other healthcare providers patient is seeing for back pain: None    He eats 0-1 servings of fruits and vegetables daily.He consumes 1 sweetened beverage(s) daily.He exercises with enough effort to increase his heart rate 9 or less minutes per day.  He exercises with enough effort to increase his heart rate 3 or less days per week.   He is taking medications regularly.       {MA/LPN/RN Pre-Provider Visit Orders- hCG/UA/Strep (Optional):336689}  {SUPERLIST (Optional):592084}  {additonal problems for provider to add (Optional):270190}    {ROS Picklists (Optional):013404}      Objective    There were no vitals taken for this visit.  There is no height or weight on file to calculate BMI.  Physical Exam    {Exam List (Optional):963125}    {Diagnostic Test Results (Optional):819226}        Signed Electronically by: Sadiq Schroeder MD  {Email feedback regarding this note to [email or web address]   :986984}

## 2024-07-18 NOTE — PATIENT INSTRUCTIONS
- I will send you a message on [address] when I am able to look at the results of your tests from today    - Physical therapy referral placed

## 2024-07-18 NOTE — PROGRESS NOTES
"Preventive Care Visit  [facility]  Sadiq Reis MD, Internal Medicine  Jul 18, 2024    Assessment & Plan   Routine general medical examination at a health care facility  Reviewed PMH. Discussed healthcare maintenance issues, including cancer screenings (not due), relevant immunizations, and cardiac risk factor screenings such as for cholesterol, HTN, and DM. Discussed role of diet in health and encouraged patient to eat more fresh foods + less processed foods. Discussed role of exercise in health and encourage patient to keeping moving their body regularly.    Acute right-sided low back pain with right-sided sciatica  No red flag symptoms. Discussed anatomy and pathophysiology of this condition. Recommended XR today to look into bony anatomy (briefly discussed ankylosing spondylitis on DDX). If reassuring, then PT.  - XR Lumbar Spine 2/3 Views; Future  - Physical Therapy  Referral; Future    BRBPR (bright red blood per rectum)  One-off event ~4 months ago. No recurrence since. Patient showed me a picture today. Blood around stool but none in bowl. Will screen CBC. If WNL, likely one-off occurrence of unknown etiology and okay to monitor. If CBC shows low hgb, then pursue colonoscopy.  - CBC with Platelets & Differential; Future  - Basic metabolic panel; Future    Encounter for screening for lipid disorder  - Lipid panel reflex to direct LDL Non-fasting; Future    BMI  Estimated body mass index is 31.4 kg/m  as calculated from the following:    Height as of this encounter: 1.848 m (6' 0.75\").    Weight as of this encounter: 107.2 kg (236 lb 6.4 oz).     Counseling  Appropriate preventive services were addressed with this patient via screening, questionnaire, or discussion as appropriate for fall prevention, nutrition, physical activity, Tobacco-use cessation, weight loss and cognition.  Checklist reviewing preventive services available has been given to the patient.  Reviewed patient's " diet, addressing concerns and/or questions.   He is at risk for psychosocial distress and has been provided with information to reduce risk.   The patient reports drinking more than 3 alcoholic drinks per day and/or more than 7 drhnks per week. The patient was counseled and given information about possible harmful effects of excessive alcohol intake.    Signed Electronically by:  Sadiq Reis MD, MPH  [facility]  Internal Medicine    Subjective   John is a 29 year old presenting alongside his partner for the following: Physical and Back Pain    Health Care Directive  Patient does not have a Health Care Directive or Living Will: Discussed advance care planning with patient; however, patient declined at this time.    History of Present Illness       Back Pain:  He presents for follow up of back pain. Patient's back pain is a new problem.    Original cause of back pain: not sure  First noticed back pain: more than 1 month ago  Patient feels back pain: constantlyLocation of back pain:  Right lower back, right hip and right side of waist  Description of back pain: sharp  Back pain spreads: right thigh and right knee    Since patient first noticed back pain, pain is: gradually worsening  Does back pain interfere with his job:  Not applicable  On a scale of 1-10 (10 being the worst), patient describes pain as:  5  What makes back pain worse: bending, sitting and twisting   Acupuncture: not tried  Acetaminophen: not helpful  Activity or exercise: not helpful  Chiropractor:  Not tried  Cold: not tried  Heat: not helpful  Massage: not tried  Muscle relaxants: not tried  NSAIDS: not helpful  Opioids: not tried  Physical Therapy: not tried  Rest: not helpful  Steroid Injection: not tried  Stretching: not helpful  Surgery: not tried  TENS unit: not tried  Topical pain relievers: not tried  Other healthcare providers patient is seeing for back pain: None    He eats 0-1 servings of fruits and  vegetables daily.He consumes 1 sweetened beverage(s) daily.He exercises with enough effort to increase his heart rate 9 or less minutes per day.  He exercises with enough effort to increase his heart rate 3 or less days per week.   He is taking medications regularly.    Concerns:  - One episode of BRB streaking bowel and on toilet paper in March  - Does not need refills of Concerta, plans to continue following with another provider        7/18/2024   General Health   How would you rate your overall physical health? (!) POOR   Feel stress (tense, anxious, or unable to sleep) Only a little      (!) STRESS CONCERN      7/18/2024   Nutrition   Three or more servings of calcium each day? Yes   Diet: Regular (no restrictions)   How many servings of fruit and vegetables per day? (!) 0-1   How many sweetened beverages each day? 0-1          7/18/2024   Exercise   Days per week of moderate/strenous exercise 0 days   Average minutes spent exercising at this level 0 min      (!) EXERCISE CONCERN      7/18/2024   Social Factors   Frequency of gathering with friends or relatives Once a week   Worry food won't last until get money to buy more No   Food not last or not have enough money for food? No   Do you have housing? (Housing is defined as stable permanent housing and does not include staying ouside in a car, in a tent, in an abandoned building, in an overnight shelter, or couch-surfing.) Yes   Are you worried about losing your housing? No   Lack of transportation? No   Unable to get utilities (heat,electricity)? No          7/18/2024   Dental   Dentist two times every year? Yes          7/18/2024   TB Screening   Were you born outside of the [place]? No      Today's PHQ-2 Score:       7/18/2024     1:25 PM   PHQ-2 ( 1999 [employer])   Q1: Little interest or pleasure in doing things 0   Q2: Feeling down, depressed or hopeless 0   PHQ-2 Score 0   Q1: Little interest or pleasure in doing things Not at all   Q2: Feeling down, depressed  "or hopeless Not at all   PHQ-2 Score 0         7/18/2024   Substance Use   Alcohol more than 3/day or more than 7/wk Yes   How often do you have a drink containing alcohol 2 to 3 times a week   How many alcohol drinks on typical day 3 or 4   How often do you have 5+ drinks at one occasion Monthly   Audit 2/3 Score 3   How often not able to stop drinking once started Never   How often failed to do what normally expected Never   How often needed first drink in am after a heavy drinking session Never   How often feeling of guilt or remorse after drinking Less than monthly   How often unable to remember what happened the night before Less than monthly   Have you or someone else been injured because of your drinking No   Has anyone been concerned or suggested you cut down on drinking No   TOTAL SCORE - AUDIT 8   Do you use any other substances recreationally? No     Social History     Tobacco Use    Smoking status: Never    Smokeless tobacco: Never   Vaping Use    Vaping status: Never Used   Substance Use Topics    Alcohol use: Yes    Drug use: No         7/18/2024   STI Screening   New sexual partner(s) since last STI/HIV test? No          7/18/2024   Contraception/Family Planning   Questions about contraception or family planning No      Reviewed and updated as needed this visit by Provider   Tobacco  Allergies  Meds  Problems  Med Hx  Surg Hx  Fam Hx            Objective    Exam  /80   Pulse 95   Temp 98  F (36.7  C) (Temporal)   Resp 15   Ht 1.848 m (6' 0.75\")   Wt 107.2 kg (236 lb 6.4 oz)   SpO2 99%   BMI 31.40 kg/m     Estimated body mass index is 31.4 kg/m  as calculated from the following:    Height as of this encounter: 1.848 m (6' 0.75\").    Weight as of this encounter: 107.2 kg (236 lb 6.4 oz).    Physical Exam  GENERAL: In no distress.  EYES: Conjunctivae/corneas clear. EOMs grossly intact.  HENT: NC/AT, facies symmetric. Neck supple. No LAD or thyromegaly noted.  RESP: CTAB. No " w/r/r.  CV: RRR, no m/r/g.  GI: NT, ND, without rebound or guarding, no CVA tenderness, no hepatomegaly appreciated.  MSK: Moves all four extremities freely. No TTP over areas of back.  SKIN: No significant ulcers, lesions or rashes on the visualized portions of the skin  NEURO: Alert. Oriented.  PSYCH: Linear thought process. Speech normal rate and volume. No tangential thoughts, hallucinations, or delusions.

## 2024-07-22 ENCOUNTER — MYC MEDICAL ADVICE (OUTPATIENT)
Dept: INTERNAL MEDICINE | Facility: CLINIC | Age: 29
End: 2024-07-22
Payer: COMMERCIAL

## 2024-07-22 DIAGNOSIS — K62.5 BRBPR (BRIGHT RED BLOOD PER RECTUM): Primary | ICD-10-CM

## 2024-07-22 DIAGNOSIS — F90.9 ATTENTION DEFICIT HYPERACTIVITY DISORDER (ADHD), UNSPECIFIED ADHD TYPE: Primary | ICD-10-CM

## 2024-07-22 RX ORDER — METHYLPHENIDATE HYDROCHLORIDE 36 MG/1
36 TABLET ORAL EVERY MORNING
Qty: 30 TABLET | Refills: 0 | Status: SHIPPED | OUTPATIENT
Start: 2024-07-22 | End: 2024-08-28

## 2024-07-22 NOTE — TELEPHONE ENCOUNTER
Patient needs appointment for future refills, or ask to transfer prescription to provider recently saw.    30 day prescription signed.    Deirdre Milligan, MD  Internal Medicine & Pediatrics  [facility] [place]  She/her

## 2024-08-21 ENCOUNTER — TRANSFERRED RECORDS (OUTPATIENT)
Dept: HEALTH INFORMATION MANAGEMENT | Facility: CLINIC | Age: 29
End: 2024-08-21
Payer: COMMERCIAL

## 2024-08-23 DIAGNOSIS — F90.9 ATTENTION DEFICIT HYPERACTIVITY DISORDER (ADHD), UNSPECIFIED ADHD TYPE: ICD-10-CM

## 2024-08-23 RX ORDER — METHYLPHENIDATE HYDROCHLORIDE 36 MG/1
36 TABLET ORAL EVERY MORNING
Qty: 30 TABLET | Refills: 0 | OUTPATIENT
Start: 2024-08-23

## 2024-08-23 NOTE — TELEPHONE ENCOUNTER
I am not managing provider. I discussed this with patient at our one visit together and he verbalized that he plans to follow with another provider for this medication. See the note from the encounter. Will defer refills to that provider.

## 2024-08-23 NOTE — TELEPHONE ENCOUNTER
Please assist patient with scheduling a visit to discuss methylphenidate HCl ER, OSM, (CONCERTA) 36 MG CR tablet.    Thanks!  Leonela BLACK RN, BSN

## 2024-08-23 NOTE — TELEPHONE ENCOUNTER
Patient had a px with another provider. Please send to them. If they did not address, patient needs appointment here.     Michelle Fermin PA-C

## 2024-08-27 DIAGNOSIS — F90.9 ATTENTION DEFICIT HYPERACTIVITY DISORDER (ADHD), UNSPECIFIED ADHD TYPE: ICD-10-CM

## 2024-08-28 RX ORDER — METHYLPHENIDATE HYDROCHLORIDE 36 MG/1
36 TABLET ORAL EVERY MORNING
Qty: 30 TABLET | Refills: 0 | Status: SHIPPED | OUTPATIENT
Start: 2024-08-28 | End: 2024-10-03

## 2024-08-28 NOTE — TELEPHONE ENCOUNTER
Romy refill as patient has appointment in 2 days. If patient does not show, no more refills until seen.    Padma Quintero PA-C  (Covering for Dr. Milligan)

## 2024-08-28 NOTE — TELEPHONE ENCOUNTER
Called and spoke to patient regarding refill pool request to clarify medication and pharmacy request. Patient states he needs refill of methylphenidate HCl ER, OSM, (CONCERTA) 36 MG CR tablet sent to [facility] in [place].    Patient not seen by Dr. Milligan in over a year. Appointment scheduled for 8/30/2024 at 11:00.    Medication and pharmacy pended.    Dr. Milligan please review and order as appropriate.    Romy Milton RN

## 2024-08-30 ENCOUNTER — OFFICE VISIT (OUTPATIENT)
Dept: PEDIATRICS | Facility: CLINIC | Age: 29
End: 2024-08-30
Payer: COMMERCIAL

## 2024-08-30 VITALS
SYSTOLIC BLOOD PRESSURE: 118 MMHG | HEART RATE: 77 BPM | OXYGEN SATURATION: 97 % | TEMPERATURE: 97.6 F | BODY MASS INDEX: 33.59 KG/M2 | HEIGHT: 72 IN | RESPIRATION RATE: 18 BRPM | WEIGHT: 248 LBS | DIASTOLIC BLOOD PRESSURE: 76 MMHG

## 2024-08-30 DIAGNOSIS — G89.29 CHRONIC MIDLINE LOW BACK PAIN WITHOUT SCIATICA: ICD-10-CM

## 2024-08-30 DIAGNOSIS — M54.50 CHRONIC MIDLINE LOW BACK PAIN WITHOUT SCIATICA: ICD-10-CM

## 2024-08-30 DIAGNOSIS — F90.9 ATTENTION DEFICIT HYPERACTIVITY DISORDER (ADHD), UNSPECIFIED ADHD TYPE: Primary | ICD-10-CM

## 2024-08-30 PROCEDURE — G2211 COMPLEX E/M VISIT ADD ON: HCPCS | Performed by: STUDENT IN AN ORGANIZED HEALTH CARE EDUCATION/TRAINING PROGRAM

## 2024-08-30 PROCEDURE — 99214 OFFICE O/P EST MOD 30 MIN: CPT | Performed by: STUDENT IN AN ORGANIZED HEALTH CARE EDUCATION/TRAINING PROGRAM

## 2024-08-30 RX ORDER — METHYLPHENIDATE HYDROCHLORIDE 36 MG/1
72 TABLET ORAL DAILY
Qty: 60 TABLET | Refills: 0 | Status: SHIPPED | OUTPATIENT
Start: 2024-08-30 | End: 2024-09-29

## 2024-08-30 RX ORDER — METHYLPHENIDATE HYDROCHLORIDE 36 MG/1
72 TABLET ORAL DAILY
Qty: 60 TABLET | Refills: 0 | Status: SHIPPED | OUTPATIENT
Start: 2024-09-29 | End: 2024-10-29

## 2024-08-30 RX ORDER — METHYLPHENIDATE HYDROCHLORIDE 36 MG/1
72 TABLET ORAL DAILY
Qty: 60 TABLET | Refills: 0 | Status: SHIPPED | OUTPATIENT
Start: 2024-10-29 | End: 2024-11-28

## 2024-08-30 NOTE — PROGRESS NOTES
"  Assessment & Plan     Attention deficit hyperactivity disorder (ADHD), unspecified ADHD type  Difficulty obtaining Concerta due to shortages so has gone weeks without medication at times. Patient will continue to take 36mg daily (keep same dose) but will prescribe so each prescription has more tablets in it so when unable to get from pharmacy due to supply issues will have extra. No negative side effects.  - methylphenidate HCl ER, OSM, (CONCERTA) 36 MG CR tablet; Take 2 tablets (72 mg) by mouth daily.  - methylphenidate HCl ER, OSM, (CONCERTA) 36 MG CR tablet; Take 2 tablets (72 mg) by mouth daily. Do not start before September 29, 2024.  - methylphenidate HCl ER, OSM, (CONCERTA) 36 MG CR tablet; Take 2 tablets (72 mg) by mouth daily. Do not start before October 29, 2024.    Chronic midline low back pain without sciatica  Normal previous X-ray. Symptoms persisting now >6 weeks with progression of symptoms and paresthesias in feet and numbness in right thigh. Recommend physical therapy (patient has to call to schedule - previously referred) and lumbar MRI if not improving.  - MR Lumbar Spine w/o Contrast; Future          BMI  Estimated body mass index is 33.23 kg/m  as calculated from the following:    Height as of this encounter: 1.84 m (6' 0.44\").    Weight as of this encounter: 112.5 kg (248 lb).               The longitudinal plan of care for the diagnosis(es)/condition(s) as documented were addressed during this visit. Due to the added complexity in care, I will continue to support Bam in the subsequent management and with ongoing continuity of care.    Subjective   Bam is a 29 year old, presenting for the following health issues:  Recheck Medication (ADHD Need refills for Concerta )      8/30/2024    10:45 AM   Additional Questions   Roomed by IN   Accompanied by Self         8/30/2024    10:45 AM   Patient Reported Additional Medications   Patient reports taking the following new medications None " "    History of Present Illness       Reason for visit:  ADHD meds / annual physical + debrief on new symptoms   He is taking medications regularly.     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  Physical Exam   GEN: Alert and appropriately interactive for age  EYES: Eyes grossly normal to inspection, conjunctivae and sclerae normal  RESP: Breathing comfortably on room air   CV: Warm and well perfused peripheral extremities   MS: no gross musculoskeletal defects noted, no edema  NEURO: Alert and oriented . Gait normal. Speech fluent.    PSYCH: Affect normal/bright. Appearance well groomed.               Signed Electronically by: Deirdre E. Milligan, MD    "

## 2024-09-04 ENCOUNTER — TRANSFERRED RECORDS (OUTPATIENT)
Dept: HEALTH INFORMATION MANAGEMENT | Facility: CLINIC | Age: 29
End: 2024-09-04
Payer: COMMERCIAL

## 2024-09-17 NOTE — PROGRESS NOTES
"PHYSICAL THERAPY EVALUATION  Type of Visit: Evaluation       Fall Risk Screen:  Fall screen completed by: PT  Have you fallen 2 or more times in the past year?: No  Have you fallen and had an injury in the past year?: No  Is patient a fall risk?: No    Subjective       Presenting condition or subjective complaint: lower back pain,hip pain, weird sensation down thigh / deep pain, middle back pain by shoulderblade  Date of onset: 07/18/24 (Referral date)    Relevant medical history:     Dates & types of surgery: this is in my [facility] record    Prior diagnostic imaging/testing results: X-ray     Prior therapy history for the same diagnosis, illness or injury:        Pt is a 29 year old male presenting with complaints of low back pain. Pt refers to bilateral (switches on/off) lower back, hip/anterior hip and side of waist as main place of pain with pain radiating to right thigh and right knee. 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 Then, in May, he feels like he \"tweaked\" his back, which ultimately went away. He did the same thing again in June and since has had alternating pain between left and right side. Pt also reports some pins/needles feeling into the feet    The symptoms started in May and is getting worse.    Pt describes the pain as significantly achy and shooting and rates it a 2/10 at rest, and a 4/10 at its worst    Aggravating Factors: bending forward, sitting, twisting, posterior pelvic tilting/tucking hips    Alleviating Factors: lying down    Prior treatments: none    Sleep Quality: not affected    Red Flags: none    Pt goals: be able to bend forward, golf, decrease pain    X-ray lumbar spine 7/18/24: IMPRESSION: Alignment of the lumbar spine is within normal limits. No loss of vertebral body height. No significant degenerative endplate changes or loss of " intervertebral disc space.     PMH: ADHD, arthritis    Living Environment  Social support: With a significant other or spouse   Type of home: House   Stairs to enter the home:         Ramp: No   Stairs inside the home: Yes 15 Is there a railing: Yes     Help at home: None  Equipment owned:       Employment: Yes [email or web address]  Hobbies/Interests: golf, [employer]    Patient goals for therapy: get back to feeling normal (even tying shoes hurts), golfing     Objective   LUMBAR:    Posture: sits with buttock forward in chair and leans back    Gait: WFL    Motor Control:   -Pelvic Tilting: increases pain but decreases with repeated motion and cues to not go to extreme end ranges of motion  -TA Activation: NT today    Functional Screen:   -Sit to stand: WFL  -Squat: WFL, no pain    ROM (* Denotes Pain):  -Flexion: hand to 3/4 way down shin  -Extension: WFL  -L SB: WFL  -R SB: WFL * (painful into R low back - pt reports this would have been painful on his left yesterday)    Repeated Motion Exam:   -Repeated flexion in standing x10: improvements in ROM; Low back pain - Some radiation to anterior L hip  -Repeated flexion in sitting: increase in low back pain; min radiation to R thigh  -Repeated extension in standing x10: no change     Neuro Screen:   Myotomes/Strength (* Denotes Pain):   Myotomes L R   L1-2 (hip flexion) 5/5* (pain in hip) 5/5   L3 (knee extension) 5/5 5/5   L4 (ankle DF) 5/5 5/5   L5 (g. toe ext) 5/5 5/5   S1 (ankle PF or knee flex) 5/5 5/5     Dermatomes: L2-S1 dermatomes intact to touch bilaterally   Reflexes: NT  Babinski: -B    Special Tests:   L R   Slump     SLR - -   Long Axis Traction      FADIR - but tight - but tight   ALINA + (pain and limited ROM compared to R) -   Scour - -   Hamstring 90/90     Piriformis Test     Posterior Capsule Compression       LE Relevant Findings:    -ROM: hip flexion with knee bent WFL, hip IR and ER min limitation L>R (painful IR on L), significantly limited  hamstring ROM (~40 degrees B)    Joint Mobility:  -PA spring: WFL T12-L5    Palpation: min TTP noted at PSIS B; mod hypertonic lumbar and lower thoracic paraspinals, quadratus lumborum, and B gluts/piriformis L>R; min TTP with abdominal palpation in L and R lower quadrant - appears to be more over iliopsoas muscle    SIJ Screen (Laslett's Cluster) (+/-):   -Distraction: NT  -Sidelying Compression: NT  -Thigh Thrust: -  -Sacral Thrust: -      Assessment & Plan   CLINICAL IMPRESSIONS  Medical Diagnosis: Acute right-sided low back pain with right-sided sciatica    Treatment Diagnosis: Low back pain with bilateral radicular symptoms and mobility deficits; thoracic back pain   Impression/Assessment: Patient is a 29 year old male with low back pain complaints.  The following significant findings have been identified: Pain, Decreased ROM/flexibility, Decreased joint mobility, Decreased strength, Impaired balance, Impaired gait, Impaired muscle performance, and Decreased activity tolerance. These impairments interfere with their ability to perform self care tasks, work tasks, recreational activities, household chores, driving , household mobility, and community mobility as compared to previous level of function.     Clinical Decision Making (Complexity):  Clinical Presentation: Stable/Uncomplicated  Clinical Presentation Rationale: based on medical and personal factors listed in PT evaluation  Clinical Decision Making (Complexity): Low complexity    PLAN OF CARE  Treatment Interventions:  Modalities: Cryotherapy, E-stim, Hot Pack, Ultrasound  Interventions: Gait Training, Manual Therapy, Neuromuscular Re-education, Therapeutic Activity, Therapeutic Exercise, Self-Care/Home Management    Long Term Goals     PT Goal 1  Goal Identifier: Tying shoes  Goal Description: Pt will be able to bend forward without a significant increase in pain in order to be able to tie shoes  Rationale: to maximize safety and independence with self  cares;to maximize safety and independence with performance of ADLs and functional tasks  Target Date: 11/04/24  PT Goal 2  Goal Identifier: Golf  Goal Description: Pt will be able to swing a golf club at least 20 times without being limited by pain in order to improve overall QOL  Rationale: to maximize safety and independence with self cares  Target Date: 12/02/24      Frequency of Treatment: 1x/week, decreasing to every other as appropriate  Duration of Treatment: 10 weeks    Recommended Referrals to Other Professionals:  none  Education Assessment:   Learner/Method: Patient    Risks and benefits of evaluation/treatment have been explained.   Patient/Family/caregiver agrees with Plan of Care.     Evaluation Time:     PT Eval, Low Complexity Minutes (69086): 20     Signing Clinician: Morenita Pham PT

## 2024-09-23 ENCOUNTER — THERAPY VISIT (OUTPATIENT)
Dept: PHYSICAL THERAPY | Facility: CLINIC | Age: 29
End: 2024-09-23
Attending: INTERNAL MEDICINE
Payer: COMMERCIAL

## 2024-09-23 DIAGNOSIS — M54.41 ACUTE RIGHT-SIDED LOW BACK PAIN WITH RIGHT-SIDED SCIATICA: ICD-10-CM

## 2024-09-23 PROCEDURE — 97530 THERAPEUTIC ACTIVITIES: CPT | Mod: GP

## 2024-09-23 PROCEDURE — 97161 PT EVAL LOW COMPLEX 20 MIN: CPT | Mod: GP

## 2024-09-23 PROCEDURE — 97110 THERAPEUTIC EXERCISES: CPT | Mod: GP

## 2024-10-01 ENCOUNTER — MYC MEDICAL ADVICE (OUTPATIENT)
Dept: PEDIATRICS | Facility: CLINIC | Age: 29
End: 2024-10-01
Payer: COMMERCIAL

## 2024-10-01 NOTE — TELEPHONE ENCOUNTER
See the  message from pt. Called pt at [phone] to go over his sx's. Pt says that the pain in his chest wall, lower back & shoulder blade. Also experiencing mild cough & SOB at times. Denies chest tightness, palpitations, dizziness, vision trouble, pain radiating to her arms/jaw, weakness, GUERRA, coughing up blood or fall/injury.     He has an appointment to get the Lumbar Spine MRI on 10/11. But he thinks that he needs MRI of his whole body to figure out the reasons for his body aches.     Scheduled an OV with Dr.Milligan on 10/3 for an eval. Advised him to go to ED with any rapid worsening sx's. He agrees to the plan.    Bautista Shah RN  [facility]

## 2024-10-03 ENCOUNTER — ANCILLARY PROCEDURE (OUTPATIENT)
Dept: GENERAL RADIOLOGY | Facility: CLINIC | Age: 29
End: 2024-10-03
Attending: STUDENT IN AN ORGANIZED HEALTH CARE EDUCATION/TRAINING PROGRAM
Payer: COMMERCIAL

## 2024-10-03 ENCOUNTER — OFFICE VISIT (OUTPATIENT)
Dept: PEDIATRICS | Facility: CLINIC | Age: 29
End: 2024-10-03
Payer: COMMERCIAL

## 2024-10-03 VITALS
HEART RATE: 85 BPM | WEIGHT: 249 LBS | RESPIRATION RATE: 16 BRPM | OXYGEN SATURATION: 98 % | TEMPERATURE: 97.3 F | SYSTOLIC BLOOD PRESSURE: 120 MMHG | BODY MASS INDEX: 33 KG/M2 | HEIGHT: 73 IN | DIASTOLIC BLOOD PRESSURE: 76 MMHG

## 2024-10-03 DIAGNOSIS — M53.3 SACROILIAC JOINT PAIN: ICD-10-CM

## 2024-10-03 DIAGNOSIS — R06.00 DYSPNEA, UNSPECIFIED TYPE: ICD-10-CM

## 2024-10-03 DIAGNOSIS — M54.6 CHRONIC MIDLINE THORACIC BACK PAIN: ICD-10-CM

## 2024-10-03 DIAGNOSIS — G89.29 CHRONIC MIDLINE THORACIC BACK PAIN: ICD-10-CM

## 2024-10-03 DIAGNOSIS — R49.0 HOARSENESS OF VOICE: Primary | ICD-10-CM

## 2024-10-03 DIAGNOSIS — Z13.828 SCREENING FOR RHEUMATOID ARTHRITIS: ICD-10-CM

## 2024-10-03 DIAGNOSIS — R49.0 HOARSENESS OF VOICE: ICD-10-CM

## 2024-10-03 LAB — ERYTHROCYTE [SEDIMENTATION RATE] IN BLOOD BY WESTERGREN METHOD: 3 MM/HR (ref 0–15)

## 2024-10-03 PROCEDURE — 36415 COLL VENOUS BLD VENIPUNCTURE: CPT | Performed by: STUDENT IN AN ORGANIZED HEALTH CARE EDUCATION/TRAINING PROGRAM

## 2024-10-03 PROCEDURE — 72202 X-RAY EXAM SI JOINTS 3/> VWS: CPT | Mod: TC | Performed by: STUDENT IN AN ORGANIZED HEALTH CARE EDUCATION/TRAINING PROGRAM

## 2024-10-03 PROCEDURE — 86200 CCP ANTIBODY: CPT | Performed by: STUDENT IN AN ORGANIZED HEALTH CARE EDUCATION/TRAINING PROGRAM

## 2024-10-03 PROCEDURE — 86140 C-REACTIVE PROTEIN: CPT | Performed by: STUDENT IN AN ORGANIZED HEALTH CARE EDUCATION/TRAINING PROGRAM

## 2024-10-03 PROCEDURE — 86431 RHEUMATOID FACTOR QUANT: CPT | Performed by: STUDENT IN AN ORGANIZED HEALTH CARE EDUCATION/TRAINING PROGRAM

## 2024-10-03 PROCEDURE — 99214 OFFICE O/P EST MOD 30 MIN: CPT | Performed by: STUDENT IN AN ORGANIZED HEALTH CARE EDUCATION/TRAINING PROGRAM

## 2024-10-03 PROCEDURE — 85652 RBC SED RATE AUTOMATED: CPT | Performed by: STUDENT IN AN ORGANIZED HEALTH CARE EDUCATION/TRAINING PROGRAM

## 2024-10-03 PROCEDURE — 71046 X-RAY EXAM CHEST 2 VIEWS: CPT | Mod: TC | Performed by: RADIOLOGY

## 2024-10-03 ASSESSMENT — PAIN SCALES - GENERAL: PAINLEVEL: MILD PAIN (2)

## 2024-10-03 ASSESSMENT — ENCOUNTER SYMPTOMS: BACK PAIN: 1

## 2024-10-03 NOTE — PROGRESS NOTES
Assessment & Plan   Overall this a generally health 29 year old male with history of ADHD who has been having months of various (mostly musculoskeletal) pains, ranging from anterior right chest, right shoulder, sacroiliac joints bilaterally, right thigh pain radiating into foot as well as some respiratory symptoms of feeling shortness of breath at times improved with coughing and hoarseness of voice. He did have an endoscopy which showed mild esophagitis and is now on omeprazole (Prilosec). Regarding the pains, there may be a unifying diagnosis to explain this such as an inflammatory or rheumatologic process like rheumatoid arthritis or spondyloarthropathy. We will evaluate for this with serologies and inflammatory markers and imaging to look at the sacroliaic joints. We will also get a chest X ray to evaluate for any mass or abnormal lung findings. If X-rays are normal, I recommend CT scans which will be able to further evaluate for evidence of interstitial lung disease, and to assess if further lymphadenopathy has developed since the CT scan in 2019 when patient had appendicitis and had enlarged para-aortic lymph nodes (which may have been reactive; but if still present or more LN are enlarged may need to consider further work up). The CT scan will also capture the spine. I do think patient should proceed with lumbar spine MRI given the radiation of pain into legs/feet. If sacroiliac X-ray is abnormal, consider HLAB2& testing and dedicated MRI of that joint. If labs/imaging are normal, Differential diagnosis includes somatic symptoms related to anxiety and meralgia paresthetica of the thigh.    Hoarseness of voice  - XR Chest 2 Views; Future  - CT Chest/Abdomen/Pelvis w Contrast; Future    Dyspnea, unspecified type  - XR Chest 2 Views; Future  - CT Chest/Abdomen/Pelvis w Contrast; Future    Chronic midline thoracic back pain  - XR Chest 2 Views; Future  - CT Chest/Abdomen/Pelvis w Contrast; Future  - ESR:  "Erythrocyte sedimentation rate; Future  - CRP, inflammation; Future  - Rheumatoid factor; Future  - Cyclic Citrullinated Peptide Antibody IgG; Future  - ESR: Erythrocyte sedimentation rate  - CRP, inflammation  - Rheumatoid factor  - Cyclic Citrullinated Peptide Antibody IgG    Screening for rheumatoid arthritis  - ESR: Erythrocyte sedimentation rate; Future  - CRP, inflammation; Future  - Rheumatoid factor; Future  - Cyclic Citrullinated Peptide Antibody IgG; Future  - ESR: Erythrocyte sedimentation rate  - CRP, inflammation  - Rheumatoid factor  - Cyclic Citrullinated Peptide Antibody IgG    Sacroiliac joint pain  - ESR: Erythrocyte sedimentation rate; Future  - CRP, inflammation; Future  - Rheumatoid factor; Future  - Cyclic Citrullinated Peptide Antibody IgG; Future  - XR Sacroiliac Joint G/E 3 Views; Future  - ESR: Erythrocyte sedimentation rate  - CRP, inflammation  - Rheumatoid factor  - Cyclic Citrullinated Peptide Antibody IgG          BMI  Estimated body mass index is 33.31 kg/m  as calculated from the following:    Height as of this encounter: 1.842 m (6' 0.5\").    Weight as of this encounter: 112.9 kg (249 lb).             Subjective   Bam is a 29 year old, presenting for the following health issues:  Back Pain and Chest Pain      10/3/2024    10:43 AM   Additional Questions   Roomed by Brenna         10/3/2024    10:43 AM   Patient Reported Additional Medications   Patient reports taking the following new medications None     Back Pain   Associated symptoms include chest pain.   Chest Pain   Associated symptoms include back pain.   History of Present Illness       Reason for visit:  Evaluation for intermittenet chest and middle back pain I have been experiencing since July / August  Symptom onset:  More than a month  Symptoms include:  Ache in chest and bottom of right shoulder blade that comes and goes  Symptom intensity:  Moderate  Symptom progression:  Staying the same  Had these symptoms before:  " "Yes  Has tried/received treatment for these symptoms:  No   He is taking medications regularly.     Went to physical therapy     Sometimes having episodes of breathlessness  Feels like oxygen is cut off then coughs and is better  Voice is hoarse  Anterior right sided chest pain that sometimes radiates through to right shoulder    Pain in right thigh but when does stretch that is supposed to relieve it, pain radiates into left thigh    Overall feels things are worsening                Objective    /76 (BP Location: Right arm, Patient Position: Sitting, Cuff Size: Adult Large)   Pulse 85   Temp 97.3  F (36.3  C) (Temporal)   Resp 16   Ht 1.842 m (6' 0.5\")   Wt 112.9 kg (249 lb)   SpO2 98%   BMI 33.31 kg/m    Body mass index is 33.31 kg/m .  Physical Exam   GEN: Alert and appropriately interactive for age  EYES: Eyes grossly normal to inspection, conjunctivae and sclerae normal  RESP: Breathing comfortably on room air   CV: Warm and well perfused peripheral extremities   MS: no gross musculoskeletal defects noted, no edema  NEURO: Alert and oriented. Gait normal. Speech fluent.    PSYCH: Affect normal/bright. Appearance well groomed.               Signed Electronically by: Deirdre E. Milligan, MD    "

## 2024-10-04 LAB
CRP SERPL-MCNC: <3 MG/L
RHEUMATOID FACT SERPL-ACNC: <10 IU/ML

## 2024-10-07 LAB — CCP AB SER IA-ACNC: 0.6 U/ML

## 2024-10-08 ENCOUNTER — THERAPY VISIT (OUTPATIENT)
Dept: PHYSICAL THERAPY | Facility: CLINIC | Age: 29
End: 2024-10-08
Payer: COMMERCIAL

## 2024-10-08 DIAGNOSIS — M54.41 ACUTE RIGHT-SIDED LOW BACK PAIN WITH RIGHT-SIDED SCIATICA: Primary | ICD-10-CM

## 2024-10-08 PROCEDURE — 97110 THERAPEUTIC EXERCISES: CPT | Mod: GP | Performed by: PHYSICAL THERAPIST

## 2024-10-11 ENCOUNTER — ANCILLARY PROCEDURE (OUTPATIENT)
Dept: MRI IMAGING | Facility: CLINIC | Age: 29
End: 2024-10-11
Attending: STUDENT IN AN ORGANIZED HEALTH CARE EDUCATION/TRAINING PROGRAM
Payer: COMMERCIAL

## 2024-10-11 DIAGNOSIS — G89.29 CHRONIC MIDLINE LOW BACK PAIN WITHOUT SCIATICA: ICD-10-CM

## 2024-10-11 DIAGNOSIS — M54.50 CHRONIC MIDLINE LOW BACK PAIN WITHOUT SCIATICA: ICD-10-CM

## 2024-10-11 PROCEDURE — 72148 MRI LUMBAR SPINE W/O DYE: CPT

## 2024-10-14 ENCOUNTER — MYC MEDICAL ADVICE (OUTPATIENT)
Dept: PEDIATRICS | Facility: CLINIC | Age: 29
End: 2024-10-14
Payer: COMMERCIAL

## 2024-10-14 DIAGNOSIS — M54.41 ACUTE RIGHT-SIDED LOW BACK PAIN WITH RIGHT-SIDED SCIATICA: ICD-10-CM

## 2024-10-14 DIAGNOSIS — M54.50 CHRONIC MIDLINE LOW BACK PAIN WITHOUT SCIATICA: Primary | ICD-10-CM

## 2024-10-14 DIAGNOSIS — M53.3 SACROILIAC JOINT PAIN: ICD-10-CM

## 2024-10-14 DIAGNOSIS — G89.29 CHRONIC MIDLINE LOW BACK PAIN WITHOUT SCIATICA: Primary | ICD-10-CM

## 2024-10-14 NOTE — TELEPHONE ENCOUNTER
Dr. Milligan- patient asking about what her MRI results mean, specifically if he has spinal stenosis and if this is a life long condition.   (See [employer] message).     Please advise.   Nathalie iVvas RN     ---------------  Dear Bam,     Here are your recent results:     The MRI showed some slipped discs with tearing of the disc. There is also some narrowing of the bones at a few different levels. This could be causing the symptoms radiating in your legs. I recommend meeting with a spine specialist to see if any further work up or intervention is needed.  [facility] will call you to coordinate your care as prescribed by your provider. If you don't hear from a representative within 2 business days, please call [phone].     Please let us know if you have questions or concerns.     Best, Deirdre Milligan, MD  Internal Medicine & Pediatrics  [facility]

## 2024-10-15 ENCOUNTER — PATIENT OUTREACH (OUTPATIENT)
Dept: CARE COORDINATION | Facility: CLINIC | Age: 29
End: 2024-10-15

## 2024-10-17 ENCOUNTER — PATIENT OUTREACH (OUTPATIENT)
Dept: CARE COORDINATION | Facility: CLINIC | Age: 29
End: 2024-10-17
Payer: COMMERCIAL

## 2024-10-17 NOTE — TELEPHONE ENCOUNTER
Dr. Milligan- patient reporting the soonest appointment for the spine specialist is end of November. He feels like his symptoms are worsening and wants to be seen sooner. Asking for another specialist referral.   (See MyChart message)    Nathalie Vivas RN

## 2024-10-18 ENCOUNTER — TELEPHONE (OUTPATIENT)
Dept: ANESTHESIOLOGY | Facility: CLINIC | Age: 29
End: 2024-10-18

## 2024-10-18 NOTE — TELEPHONE ENCOUNTER
Patient confirmed scheduled appointment:     Date: 10/25/24  Time: 7:00 AM  Visit type: New Med Spine Physician  Visit mode: In Person  Provider: Dr. Papo Carranza  [place] [facility] [place] Notes: Rescheduled from 11/20/24

## 2024-10-22 ENCOUNTER — TELEPHONE (OUTPATIENT)
Dept: ANESTHESIOLOGY | Facility: CLINIC | Age: 29
End: 2024-10-22

## 2024-10-23 ENCOUNTER — MYC REFILL (OUTPATIENT)
Dept: PEDIATRICS | Facility: CLINIC | Age: 29
End: 2024-10-23
Payer: COMMERCIAL

## 2024-10-23 DIAGNOSIS — R20.2 TINGLING OF BOTH FEET: ICD-10-CM

## 2024-10-23 RX ORDER — MULTIVITAMIN,THERAPEUTIC
1 TABLET ORAL DAILY
Qty: 90 TABLET | Refills: 4 | Status: SHIPPED | OUTPATIENT
Start: 2024-10-23

## 2024-10-24 ENCOUNTER — HOSPITAL ENCOUNTER (OUTPATIENT)
Dept: CT IMAGING | Facility: CLINIC | Age: 29
Discharge: HOME | End: 2024-10-24
Attending: STUDENT IN AN ORGANIZED HEALTH CARE EDUCATION/TRAINING PROGRAM | Admitting: STUDENT IN AN ORGANIZED HEALTH CARE EDUCATION/TRAINING PROGRAM
Payer: COMMERCIAL

## 2024-10-24 DIAGNOSIS — G89.29 CHRONIC MIDLINE THORACIC BACK PAIN: ICD-10-CM

## 2024-10-24 DIAGNOSIS — R49.0 HOARSENESS OF VOICE: ICD-10-CM

## 2024-10-24 DIAGNOSIS — M54.6 CHRONIC MIDLINE THORACIC BACK PAIN: ICD-10-CM

## 2024-10-24 DIAGNOSIS — R06.00 DYSPNEA, UNSPECIFIED TYPE: ICD-10-CM

## 2024-10-24 PROCEDURE — 250N000011 HC RX IP 250 OP 636: Performed by: STUDENT IN AN ORGANIZED HEALTH CARE EDUCATION/TRAINING PROGRAM

## 2024-10-24 PROCEDURE — 74177 CT ABD & PELVIS W/CONTRAST: CPT

## 2024-10-24 PROCEDURE — 250N000009 HC RX 250: Performed by: STUDENT IN AN ORGANIZED HEALTH CARE EDUCATION/TRAINING PROGRAM

## 2024-10-24 RX ORDER — IOPAMIDOL 755 MG/ML
122 INJECTION, SOLUTION INTRAVASCULAR ONCE
Status: COMPLETED | OUTPATIENT
Start: 2024-10-24 | End: 2024-10-24

## 2024-10-24 RX ADMIN — IOPAMIDOL 122 ML: 755 INJECTION, SOLUTION INTRAVENOUS at 10:33

## 2024-10-24 RX ADMIN — SODIUM CHLORIDE 80 ML: 9 INJECTION, SOLUTION INTRAVENOUS at 10:33

## 2024-10-25 ENCOUNTER — OFFICE VISIT (OUTPATIENT)
Dept: ANESTHESIOLOGY | Facility: CLINIC | Age: 29
End: 2024-10-25
Attending: STUDENT IN AN ORGANIZED HEALTH CARE EDUCATION/TRAINING PROGRAM
Payer: COMMERCIAL

## 2024-10-25 VITALS
OXYGEN SATURATION: 100 % | RESPIRATION RATE: 16 BRPM | SYSTOLIC BLOOD PRESSURE: 123 MMHG | DIASTOLIC BLOOD PRESSURE: 77 MMHG | HEART RATE: 96 BPM

## 2024-10-25 DIAGNOSIS — G89.29 CHRONIC MIDLINE LOW BACK PAIN WITHOUT SCIATICA: ICD-10-CM

## 2024-10-25 DIAGNOSIS — M53.3 SACROILIAC JOINT PAIN: ICD-10-CM

## 2024-10-25 DIAGNOSIS — M54.50 CHRONIC MIDLINE LOW BACK PAIN WITHOUT SCIATICA: ICD-10-CM

## 2024-10-25 DIAGNOSIS — M54.41 ACUTE RIGHT-SIDED LOW BACK PAIN WITH RIGHT-SIDED SCIATICA: ICD-10-CM

## 2024-10-25 PROCEDURE — 99203 OFFICE O/P NEW LOW 30 MIN: CPT | Performed by: STUDENT IN AN ORGANIZED HEALTH CARE EDUCATION/TRAINING PROGRAM

## 2024-10-25 ASSESSMENT — PAIN SCALES - GENERAL: PAINLEVEL_OUTOF10: MILD PAIN (2)

## 2024-10-25 NOTE — PATIENT INSTRUCTIONS
Treatment planning:    Contact our clinic if interested in Lumbar epidural steroid injection.      Recommended Follow up:      Follow up as needed       To speak with a nurse, schedule/reschedule/cancel a clinic appointment, or request a medication refill call: [phone].    You can also reach us by [employer]: [email or web address]

## 2024-10-25 NOTE — NURSING NOTE
RN reviewed AVS with patient. Patient to contact clinic if any questions/concerns. Patient verbalized understanding.    Sania Yeboah RNCC

## 2024-10-25 NOTE — NURSING NOTE
"Patient presents with:  Consult  Back Pain: \"My spine is all dicked up\"      Mild Pain (2)         What medications are you using for pain? nothing    Have you been seen by another pain clinic/ provider? no    Expectations: Tell me why back is bad-resolve situation    Fadumo Herrera, MYRTLE      "

## 2024-10-25 NOTE — PROGRESS NOTES
"[facility]  Consultation Note    PCP: Milligan, Deirdre E  Referring Provider: Milligan  Reason for consultation:  John Lamar is a 29 year old male who is seen in consultation today at the request of his provider, Deirdre E Milligan, MD.    Chief Complaint  Chief Complaint   Patient presents with    Consult    Back Pain     \"My spine is all dicked up\"       Pain History  John Lamar is a 29 year old male who presents for initial evaluation of low back pain. He gets pains in his right thigh and \"electric-like\" pain on the top of his right foot. The pain started in ~May 2024 without apparent inciting event. He sits for a long time at work and feels after golfing he worsened his back pain. The pain alternates between left and right side. Has seen a PT recently (has had two visit). Pain in his thighs is worse with brisk walking or running.    Pain Scores  Worst: 6/10  Best: 1/10  Average 3/10    Pain Characteristics  Quality: \"post-workout ache, put epoxy on your vertebrae, tight/stiff, deep in your bone\"  Aggravating factors include: lumbar flexion with hips tucked, lumbar extension  Relieving factors include: decreased activity  Location: low back, legs  Timing: Waxes and wanes with intensity    Sleep  Quality: Good  Medications: None    Functional Impairments  Self-rated function: 70%  Regular physical activity: sedentary  Functional goals: \"play golf\"    Work Status  Employment: Full time  Work related injury?: no  Seeking disability: no  Involved in litigation: no    Social History  Lives at home with significant other.  Smoking: Never  Alcohol:  socially (5 drinks/week)  Nonprescription drugs: None    Current Pain Medications  Methylphenidate (72mg)    Previous Pain Medication Trials  Acetaminophen: None  NSAIDs: Aleve (helpful)  Opioids: None  Antidepressants: None  Anticonvulsants: None  Skeletal muscle relaxants: None  Topicals: None    Other Therapies  John Lamar has not been seen " at a pain clinic in the past.  PT: Recently established  Acupuncture: None  TENs Unit: None  Procedures: None    Past Medical History:   Diagnosis Date    ADHD (attention deficit hyperactivity disorder)     Arthritis     past medical history reviewed with patient.   Past Surgical History:   Procedure Laterality Date    LAPAROSCOPIC APPENDECTOMY N/A 9/6/2019    Procedure: APPENDECTOMY, LAPAROSCOPIC;  Surgeon: Alfredo Guardado MD;  Location: [facility] OR    past surgical history reviewed with patient.     Medications  Current Outpatient Medications   Medication Sig Dispense Refill    methylphenidate HCl ER, OSM, (CONCERTA) 36 MG CR tablet Take 2 tablets (72 mg) by mouth daily. Do not start before September 29, 2024. 60 tablet 0    [START ON 10/29/2024] methylphenidate HCl ER, OSM, (CONCERTA) 36 MG CR tablet Take 2 tablets (72 mg) by mouth daily. Do not start before October 29, 2024. 60 tablet 0    multivitamin, therapeutic (THERA-VIT) TABS tablet Take 1 tablet by mouth daily. 90 tablet 4     No current facility-administered medications for this visit.     [place] and [place] Prescription Monitoring Program reviewed    Allergies     Allergies   Allergen Reactions    Sulfa Antibiotics Rash       Family History  family history includes Cerebrovascular Disease in his maternal grandfather; Diabetes in his paternal grandfather; Other Cancer in his father.  Review Of Systems  Skin: negative  Eyes: negative  Ears/Nose/Throat: negative  Respiratory: No shortness of breath, dyspnea on exertion, cough, or hemoptysis  Cardiovascular: negative  Gastrointestinal: negative  Genitourinary: negative  Musculoskeletal: negative  Neurologic: negative  Psychiatric: negative  Hematologic/Lymphatic/Immunologic: negative  Endocrine: negative    Physical Exam  Vitals:    10/25/24 0715   BP: 123/77   Pulse: 96   Resp: 16   SpO2: 100%     There is no height or weight on file to calculate BMI.  General: In no apparent distress  Mental status: Normal affect,  pleasant  Head: Atraumatic, normocephalic  Eyes: Extra-ocular movements grossly intact, no scleral icterus, pupils non-pinpoint  Cardiovascular: Regular rate  Respiratory: Comfortable respiratory rate and rhythm  Skin: No rashes or lesions noted on exposed areas of skin  Lymph: No supraclavicular lymphadenopathy  Musculoskeletal:  Gait/Station/Posture: No major issues  Cervical spine: Normal  Thoracic spine: Normal    Lumbar spine: Relatively normal   Familiar pain with lumbar facet loading.   Lumbar paraspinals tight, not definitive trigger points. Mild TTP.    Neurologic:  CN:  CN II-XII are grossly intact.  Sensory:  Sensation intact to light touch throughout the L2-S1 dermatomes of the bilateral lower extremities.   Motor:  Strength 5/5 for bilateral hip flexion, knee extension, dorsiflexion, EHL, and plantarflexion.     Pertinent Imaging  Personally reviewed imaging:    MRI L-Spine (10/11/24)  1.  Multilevel degenerative changes of lumbar spine, as above.  2.  At L3-L4 there is a right of midline/right lateral recess disc protrusion with associated high-intensity zone/annular tear. At this level there is severe right lateral recess stenosis with probable contact of the descending right L4 cauda equina   nerve root. Additionally, there is mild-to-moderate central spinal canal stenosis and minimal right neural foraminal stenosis. Correlation for a right L4 radiculopathy is recommended.  3.  At L4-L5, there is a midline disc protrusion with associated high-intensity zone/annular tear. At this level there is mild central spinal canal stenosis with mild bilateral lateral recess stenosis.  4.  At L5-S1, there is a left lateral recess disc protrusion which causes moderate left lateral recess stenosis and comes in close proximity/possibly contacts the descending left S1 cauda equina nerve root. Overall there is mild central spinal canal stenosis at this level without significant neural foraminal stenosis. Correlation  for a left S1 radiculopathy is recommended.    XR SI Joints (10/3/24)  Normal joint spaces and alignment. No acute fracture. No erosive change.    XR Chest (10/3/24)  There are no acute infiltrates. The cardiac silhouette is not enlarged. Pulmonary vasculature is unremarkable.    XR L-Spine (7/18/24)  Alignment of the lumbar spine is within normal limits. No loss of vertebral body height. No significant degenerative endplate changes or loss of intervertebral disc space.       Other Tests  I personally reviewed the following today:  Labs: BMP (7/18/24) reviewed with mildly decreased renal function    Record Review and Communications  Recent primary care notes reviewed today.  Reviewed the [place] Prescription Monitoring Program today.    Assessment  John Lamar is a 29 year old male who presents with low back, upper leg, and left foot pain. The source of his pain is not entirely clear and is likely multifactorial. There appears to be a myofascial component mixed with neurogenic claudication. There does not appear to be a definitive radiculopathy and no concern for myelopathy. The dorsal aspect of his left foot hurts when brushing it or standing on his toes; there may be a peripheral mononeuropathy but it's not clear at this time.    Myofascial pain syndrome  Spinal stenosis    Plan  Diagnosis reviewed, treatment option addressed, and risk/benefits discussed.  Self-care instructions given. I am recommending a multidisciplinary treatment plan to help this patient better manage his pain.      Physical therapy/home exercise program:  Continue work with recently established PT  Encouraged HEP, consider looking into [place] [employer] [place] on Youtube  Medications: Does not want to take medications  Discussed duloxetine and not interested at this time.  Interventions:  Discussed L4/5 ADRI for neurogenic claudication symptoms. MRI findings support spinal canal stenosis.  He is currently participating in PT.  Has failed a trial of APAP and  ibuprofen.  Is not interested at this time, but will do some reading. Okay to schedule if he contacts us through MyChart or phone call.  New diagnostics ordered today: None  Referrals:  Not interested in pain PT at this time but may consider in the future.   Follow up: As needed      Total time spent was 45 minutes, and more than 50% of face to face time was spent in counseling and/or coordination of care regarding principles of multidisciplinary care, medication management, and interventional options.      Papo Carranza MD  Department of Anesthesiology  Chronic and Interventional Pain Medicine

## 2024-10-25 NOTE — LETTER
"10/25/2024       RE: John Lamar  [address] [facility] [id number]     Dear Colleague,    Thank you for referring your patient, John Lamar, to the [facility] [place] at [facility]. Please see a copy of my visit note below.    [facility]  Consultation Note    PCP: Milligan, Deirdre E  Referring Provider: Milligan  Reason for consultation:  John Lamar is a 29 year old male who is seen in consultation today at the request of his provider, Deirdre E Milligan, MD.    Chief Complaint  Chief Complaint   Patient presents with     Consult     Back Pain     \"My spine is all dicked up\"       Pain History  John Lamar is a 29 year old male who presents for initial evaluation of low back pain. He gets pains in his right thigh and \"electric-like\" pain on the top of his right foot. The pain started in ~May 2024 without apparent inciting event. He sits for a long time at work and feels after golfing he worsened his back pain. The pain alternates between left and right side. Has seen a PT recently (has had two visit). Pain in his thighs is worse with brisk walking or running.    Pain Scores  Worst: 6/10  Best: 1/10  Average 3/10    Pain Characteristics  Quality: \"post-workout ache, put epoxy on your vertebrae, tight/stiff, deep in your bone\"  Aggravating factors include: lumbar flexion with hips tucked, lumbar extension  Relieving factors include: decreased activity  Location: low back, legs  Timing: Waxes and wanes with intensity    Sleep  Quality: Good  Medications: None    Functional Impairments  Self-rated function: 70%  Regular physical activity: sedentary  Functional goals: \"play golf\"    Work Status  Employment: Full time  Work related injury?: no  Seeking disability: no  Involved in litigation: no    Social History  Lives at home with significant other.  Smoking: Never  Alcohol:  socially " (5 drinks/week)  Nonprescription drugs: None    Current Pain Medications  Methylphenidate (72mg)    Previous Pain Medication Trials  Acetaminophen: None  NSAIDs: Aleve (helpful)  Opioids: None  Antidepressants: None  Anticonvulsants: None  Skeletal muscle relaxants: None  Topicals: None    Other Therapies  John Lamar has not been seen at a pain clinic in the past.  PT: Recently established  Acupuncture: None  TENs Unit: None  Procedures: None    Past Medical History:   Diagnosis Date     ADHD (attention deficit hyperactivity disorder)      Arthritis     past medical history reviewed with patient.   Past Surgical History:   Procedure Laterality Date     LAPAROSCOPIC APPENDECTOMY N/A 9/6/2019    Procedure: APPENDECTOMY, LAPAROSCOPIC;  Surgeon: Alfredo Guardado MD;  Location: [facility] OR    past surgical history reviewed with patient.     Medications  Current Outpatient Medications   Medication Sig Dispense Refill     methylphenidate HCl ER, OSM, (CONCERTA) 36 MG CR tablet Take 2 tablets (72 mg) by mouth daily. Do not start before September 29, 2024. 60 tablet 0     [START ON 10/29/2024] methylphenidate HCl ER, OSM, (CONCERTA) 36 MG CR tablet Take 2 tablets (72 mg) by mouth daily. Do not start before October 29, 2024. 60 tablet 0     multivitamin, therapeutic (THERA-VIT) TABS tablet Take 1 tablet by mouth daily. 90 tablet 4     No current facility-administered medications for this visit.     [place] and [place] Prescription Monitoring Program reviewed    Allergies     Allergies   Allergen Reactions     Sulfa Antibiotics Rash       Family History  family history includes Cerebrovascular Disease in his maternal grandfather; Diabetes in his paternal grandfather; Other Cancer in his father.  Review Of Systems  Skin: negative  Eyes: negative  Ears/Nose/Throat: negative  Respiratory: No shortness of breath, dyspnea on exertion, cough, or hemoptysis  Cardiovascular: negative  Gastrointestinal: negative  Genitourinary:  negative  Musculoskeletal: negative  Neurologic: negative  Psychiatric: negative  Hematologic/Lymphatic/Immunologic: negative  Endocrine: negative    Physical Exam  Vitals:    10/25/24 0715   BP: 123/77   Pulse: 96   Resp: 16   SpO2: 100%     There is no height or weight on file to calculate BMI.  General: In no apparent distress  Mental status: Normal affect, pleasant  Head: Atraumatic, normocephalic  Eyes: Extra-ocular movements grossly intact, no scleral icterus, pupils non-pinpoint  Cardiovascular: Regular rate  Respiratory: Comfortable respiratory rate and rhythm  Skin: No rashes or lesions noted on exposed areas of skin  Lymph: No supraclavicular lymphadenopathy  Musculoskeletal:  Gait/Station/Posture: No major issues  Cervical spine: Normal  Thoracic spine: Normal    Lumbar spine: Relatively normal   Familiar pain with lumbar facet loading.   Lumbar paraspinals tight, not definitive trigger points. Mild TTP.    Neurologic:  CN:  CN II-XII are grossly intact.  Sensory:  Sensation intact to light touch throughout the L2-S1 dermatomes of the bilateral lower extremities.   Motor:  Strength 5/5 for bilateral hip flexion, knee extension, dorsiflexion, EHL, and plantarflexion.     Pertinent Imaging  Personally reviewed imaging:    MRI L-Spine (10/11/24)  1.  Multilevel degenerative changes of lumbar spine, as above.  2.  At L3-L4 there is a right of midline/right lateral recess disc protrusion with associated high-intensity zone/annular tear. At this level there is severe right lateral recess stenosis with probable contact of the descending right L4 cauda equina   nerve root. Additionally, there is mild-to-moderate central spinal canal stenosis and minimal right neural foraminal stenosis. Correlation for a right L4 radiculopathy is recommended.  3.  At L4-L5, there is a midline disc protrusion with associated high-intensity zone/annular tear. At this level there is mild central spinal canal stenosis with mild  bilateral lateral recess stenosis.  4.  At L5-S1, there is a left lateral recess disc protrusion which causes moderate left lateral recess stenosis and comes in close proximity/possibly contacts the descending left S1 cauda equina nerve root. Overall there is mild central spinal canal stenosis at this level without significant neural foraminal stenosis. Correlation for a left S1 radiculopathy is recommended.    XR SI Joints (10/3/24)  Normal joint spaces and alignment. No acute fracture. No erosive change.    XR Chest (10/3/24)  There are no acute infiltrates. The cardiac silhouette is not enlarged. Pulmonary vasculature is unremarkable.    XR L-Spine (7/18/24)  Alignment of the lumbar spine is within normal limits. No loss of vertebral body height. No significant degenerative endplate changes or loss of intervertebral disc space.       Other Tests  I personally reviewed the following today:  Labs: BMP (7/18/24) reviewed with mildly decreased renal function    Record Review and Communications  Recent primary care notes reviewed today.  Reviewed the [place] Prescription Monitoring Program today.    Assessment  John Lamar is a 29 year old male who presents with low back, upper leg, and left foot pain. The source of his pain is not entirely clear and is likely multifactorial. There appears to be a myofascial component mixed with neurogenic claudication. There does not appear to be a definitive radiculopathy and no concern for myelopathy. The dorsal aspect of his left foot hurts when brushing it or standing on his toes; there may be a peripheral mononeuropathy but it's not clear at this time.    Myofascial pain syndrome  Spinal stenosis    Plan  Diagnosis reviewed, treatment option addressed, and risk/benefits discussed.  Self-care instructions given. I am recommending a multidisciplinary treatment plan to help this patient better manage his pain.      Physical therapy/home exercise program:  Continue work with recently  established PT  Encouraged HEP, consider looking into [facility] [employer] [facility] on Youtube  Medications: Does not want to take medications  Discussed duloxetine and not interested at this time.  Interventions:  Discussed L4/5 ADRI for neurogenic claudication symptoms. MRI findings support spinal canal stenosis.  He is currently participating in PT.  Has failed a trial of APAP and ibuprofen.  Is not interested at this time, but will do some reading. Okay to schedule if he contacts us through [employer] or phone call.  New diagnostics ordered today: None  Referrals:  Not interested in pain PT at this time but may consider in the future.   Follow up: As needed      Total time spent was 45 minutes, and more than 50% of face to face time was spent in counseling and/or coordination of care regarding principles of multidisciplinary care, medication management, and interventional options.      Papo Carranza MD  Department of Anesthesiology  Chronic and Interventional Pain Medicine      Again, thank you for allowing me to participate in the care of your patient.      Sincerely,    Papo Carranza MD

## 2024-11-06 ENCOUNTER — MYC REFILL (OUTPATIENT)
Dept: PEDIATRICS | Facility: CLINIC | Age: 29
End: 2024-11-06
Payer: COMMERCIAL

## 2024-11-06 DIAGNOSIS — F90.9 ATTENTION DEFICIT HYPERACTIVITY DISORDER (ADHD), UNSPECIFIED ADHD TYPE: ICD-10-CM

## 2024-11-06 RX ORDER — METHYLPHENIDATE HYDROCHLORIDE 36 MG/1
72 TABLET ORAL DAILY
Qty: 60 TABLET | Refills: 0 | Status: CANCELLED | OUTPATIENT
Start: 2024-11-06

## 2024-11-07 RX ORDER — METHYLPHENIDATE HYDROCHLORIDE 36 MG/1
72 TABLET ORAL DAILY
Qty: 60 TABLET | Refills: 0 | Status: SHIPPED | OUTPATIENT
Start: 2024-12-07 | End: 2025-01-06

## 2024-11-07 RX ORDER — METHYLPHENIDATE HYDROCHLORIDE 36 MG/1
72 TABLET ORAL DAILY
Qty: 60 TABLET | Refills: 0 | Status: SHIPPED | OUTPATIENT
Start: 2024-11-07 | End: 2024-12-07

## 2024-11-07 RX ORDER — METHYLPHENIDATE HYDROCHLORIDE 36 MG/1
72 TABLET ORAL DAILY
Qty: 60 TABLET | Refills: 0 | Status: SHIPPED | OUTPATIENT
Start: 2025-01-06 | End: 2025-02-05

## 2025-01-09 ENCOUNTER — MYC MEDICAL ADVICE (OUTPATIENT)
Dept: PEDIATRICS | Facility: CLINIC | Age: 30
End: 2025-01-09
Payer: COMMERCIAL

## 2025-01-09 ENCOUNTER — MYC REFILL (OUTPATIENT)
Dept: PEDIATRICS | Facility: CLINIC | Age: 30
End: 2025-01-09
Payer: COMMERCIAL

## 2025-01-09 DIAGNOSIS — F90.9 ATTENTION DEFICIT HYPERACTIVITY DISORDER (ADHD), UNSPECIFIED ADHD TYPE: ICD-10-CM

## 2025-01-09 RX ORDER — METHYLPHENIDATE HYDROCHLORIDE 36 MG/1
72 TABLET ORAL DAILY
Qty: 60 TABLET | Refills: 0 | Status: SHIPPED | OUTPATIENT
Start: 2025-02-08 | End: 2025-03-10

## 2025-01-09 RX ORDER — METHYLPHENIDATE HYDROCHLORIDE 36 MG/1
72 TABLET ORAL DAILY
Qty: 60 TABLET | Refills: 0 | Status: CANCELLED | OUTPATIENT
Start: 2025-01-09

## 2025-01-09 RX ORDER — METHYLPHENIDATE HYDROCHLORIDE 36 MG/1
72 TABLET ORAL DAILY
Qty: 60 TABLET | Refills: 0 | Status: SHIPPED | OUTPATIENT
Start: 2025-01-09 | End: 2025-02-08

## 2025-01-09 RX ORDER — METHYLPHENIDATE HYDROCHLORIDE 36 MG/1
72 TABLET ORAL DAILY
Qty: 60 TABLET | Refills: 0 | Status: SHIPPED | OUTPATIENT
Start: 2025-03-10 | End: 2025-04-09

## 2025-01-13 NOTE — TELEPHONE ENCOUNTER
Advised patient to write other labs and write standing orders in comments box.  Cassandra Velásquez LPN

## 2025-01-19 ENCOUNTER — HOSPITAL ENCOUNTER (OUTPATIENT)
Dept: CT IMAGING | Facility: CLINIC | Age: 30
Discharge: HOME | End: 2025-01-19
Attending: STUDENT IN AN ORGANIZED HEALTH CARE EDUCATION/TRAINING PROGRAM | Admitting: STUDENT IN AN ORGANIZED HEALTH CARE EDUCATION/TRAINING PROGRAM
Payer: COMMERCIAL

## 2025-01-19 DIAGNOSIS — M79.651 BILATERAL THIGH PAIN: ICD-10-CM

## 2025-01-19 DIAGNOSIS — M79.652 BILATERAL THIGH PAIN: ICD-10-CM

## 2025-01-19 PROCEDURE — 73700 CT LOWER EXTREMITY W/O DYE: CPT | Mod: 50

## 2025-01-20 ENCOUNTER — MYC MEDICAL ADVICE (OUTPATIENT)
Dept: PEDIATRICS | Facility: CLINIC | Age: 30
End: 2025-01-20
Payer: COMMERCIAL

## 2025-01-22 NOTE — TELEPHONE ENCOUNTER
Discussed benign results with patient via telephone.    Deirdre Milligan, MD  Internal Medicine & Pediatrics  [facility]  She/her

## 2025-03-24 ENCOUNTER — MYC REFILL (OUTPATIENT)
Dept: PEDIATRICS | Facility: CLINIC | Age: 30
End: 2025-03-24
Payer: COMMERCIAL

## 2025-03-24 DIAGNOSIS — F90.9 ATTENTION DEFICIT HYPERACTIVITY DISORDER (ADHD), UNSPECIFIED ADHD TYPE: ICD-10-CM

## 2025-03-24 RX ORDER — METHYLPHENIDATE HYDROCHLORIDE 36 MG/1
72 TABLET ORAL DAILY
Qty: 60 TABLET | Refills: 0 | Status: CANCELLED | OUTPATIENT
Start: 2025-03-24

## 2025-03-24 RX ORDER — METHYLPHENIDATE HYDROCHLORIDE 36 MG/1
36 TABLET ORAL DAILY
Qty: 30 TABLET | Refills: 0 | Status: SHIPPED | OUTPATIENT
Start: 2025-03-24 | End: 2025-04-23

## 2025-03-24 RX ORDER — METHYLPHENIDATE HYDROCHLORIDE 36 MG/1
36 TABLET ORAL DAILY
Qty: 30 TABLET | Refills: 0 | Status: SHIPPED | OUTPATIENT
Start: 2025-05-23 | End: 2025-06-22

## 2025-03-24 RX ORDER — METHYLPHENIDATE HYDROCHLORIDE 36 MG/1
36 TABLET ORAL DAILY
Qty: 30 TABLET | Refills: 0 | Status: SHIPPED | OUTPATIENT
Start: 2025-04-23 | End: 2025-05-23

## 2025-04-29 ENCOUNTER — MYC REFILL (OUTPATIENT)
Dept: PEDIATRICS | Facility: CLINIC | Age: 30
End: 2025-04-29
Payer: COMMERCIAL

## 2025-04-29 DIAGNOSIS — F90.9 ATTENTION DEFICIT HYPERACTIVITY DISORDER (ADHD), UNSPECIFIED ADHD TYPE: ICD-10-CM

## 2025-04-29 RX ORDER — METHYLPHENIDATE HYDROCHLORIDE 36 MG/1
36 TABLET ORAL DAILY
Qty: 30 TABLET | Refills: 0 | Status: SHIPPED | OUTPATIENT
Start: 2025-06-28 | End: 2025-07-28

## 2025-04-29 RX ORDER — METHYLPHENIDATE HYDROCHLORIDE 36 MG/1
36 TABLET ORAL DAILY
Qty: 30 TABLET | Refills: 0 | Status: SHIPPED | OUTPATIENT
Start: 2025-05-29 | End: 2025-06-28

## 2025-04-29 RX ORDER — METHYLPHENIDATE HYDROCHLORIDE 36 MG/1
36 TABLET ORAL DAILY
Qty: 30 TABLET | Refills: 0 | Status: CANCELLED | OUTPATIENT
Start: 2025-04-29

## 2025-04-29 RX ORDER — METHYLPHENIDATE HYDROCHLORIDE 36 MG/1
36 TABLET ORAL DAILY
Qty: 30 TABLET | Refills: 0 | Status: SHIPPED | OUTPATIENT
Start: 2025-04-29 | End: 2025-05-29

## 2025-04-30 PROBLEM — M54.41 ACUTE RIGHT-SIDED LOW BACK PAIN WITH RIGHT-SIDED SCIATICA: Status: RESOLVED | Noted: 2024-09-23 | Resolved: 2025-04-30

## 2025-06-18 ENCOUNTER — PATIENT OUTREACH (OUTPATIENT)
Dept: CARE COORDINATION | Facility: CLINIC | Age: 30
End: 2025-06-18
Payer: COMMERCIAL

## 2025-08-23 ENCOUNTER — HEALTH MAINTENANCE LETTER (OUTPATIENT)
Age: 30
End: 2025-08-23

## (undated) DEVICE — LINEN FULL SHEET 5511

## (undated) DEVICE — SU VICRYL 4-0 P-3 18" UND J494G

## (undated) DEVICE — ENDO TROCAR FIRST ENTRY KII FIOS Z-THRD 05X100MM CTF03

## (undated) DEVICE — GLOVE PROTEXIS BLUE W/NEU-THERA 6.5  2D73EB65

## (undated) DEVICE — Device

## (undated) DEVICE — ESU GROUND PAD ADULT W/CORD E7507

## (undated) DEVICE — GLOVE PROTEXIS POWDER FREE SMT 7.5  2D72PT75X

## (undated) DEVICE — BAG CLEAR TRASH 1.3M 39X33" P4040C

## (undated) DEVICE — ENDO TROCAR SLEEVE KII Z-THREADED 05X100MM CTS02

## (undated) DEVICE — GLOVE PROTEXIS POWDER FREE 8.0 ORTHOPEDIC 2D73ET80

## (undated) DEVICE — ESU PENCIL W/HOLSTER E2350H

## (undated) DEVICE — ESU ELEC BLADE 2.75" COATED/INSULATED E1455

## (undated) DEVICE — LINEN POUCH DBL 5427

## (undated) DEVICE — SOL NACL 0.9% INJ 1000ML BAG 2B1324X

## (undated) DEVICE — SUCTION IRR STRYKERFLOW II W/TIP 250-070-520

## (undated) DEVICE — SU VICRYL 0 CT-2 CR 8X18" J727D

## (undated) DEVICE — ENDO POUCH UNIV RETRIEVAL SYSTEM INZII 10MM CD001

## (undated) DEVICE — NDL 22GA 1.5"

## (undated) DEVICE — STPL POWERED ECHELON 45MM PSEE45A

## (undated) DEVICE — LINEN HALF SHEET 5512

## (undated) DEVICE — ESU CORD MONOPOLAR 10'  E0510

## (undated) DEVICE — DECANTER VIAL 2006S

## (undated) DEVICE — LINEN TOWEL PACK X10 5473

## (undated) DEVICE — SUCTION CANISTER MEDIVAC LINER 3000ML W/LID 65651-530

## (undated) DEVICE — SOL NACL 0.9% IRRIG 1000ML BOTTLE 2F7124

## (undated) DEVICE — SOL ADH LIQUID BENZOIN SWAB 0.6ML C1544

## (undated) DEVICE — BLADE CLIPPER 3M 9670

## (undated) DEVICE — ENDO TROCAR OPTICAL ACCESS KII Z-THRD 12X100MM C0R85

## (undated) RX ORDER — BUPIVACAINE HYDROCHLORIDE 5 MG/ML
INJECTION, SOLUTION EPIDURAL; INTRACAUDAL
Status: DISPENSED
Start: 2019-09-06

## (undated) RX ORDER — FENTANYL CITRATE 50 UG/ML
INJECTION, SOLUTION INTRAMUSCULAR; INTRAVENOUS
Status: DISPENSED
Start: 2019-09-06

## (undated) RX ORDER — OXYCODONE HYDROCHLORIDE 5 MG/1
TABLET ORAL
Status: DISPENSED
Start: 2019-09-06

## (undated) RX ORDER — KETOROLAC TROMETHAMINE 30 MG/ML
INJECTION, SOLUTION INTRAMUSCULAR; INTRAVENOUS
Status: DISPENSED
Start: 2019-09-06